# Patient Record
Sex: FEMALE | Race: WHITE | NOT HISPANIC OR LATINO | Employment: OTHER | URBAN - METROPOLITAN AREA
[De-identification: names, ages, dates, MRNs, and addresses within clinical notes are randomized per-mention and may not be internally consistent; named-entity substitution may affect disease eponyms.]

---

## 2023-02-24 LAB — THYROTROPIN (MIU/L) IN SER/PLAS BY DETECTION LIMIT <= 0.05 MIU/L: 1.63 MIU/L (ref 0.44–3.98)

## 2023-09-29 ENCOUNTER — TELEPHONE (OUTPATIENT)
Dept: PRIMARY CARE | Facility: CLINIC | Age: 63
End: 2023-09-29
Payer: COMMERCIAL

## 2023-09-29 RX ORDER — MONTELUKAST SODIUM 10 MG/1
10 TABLET ORAL EVERY 24 HOURS
COMMUNITY
Start: 2023-03-29 | End: 2023-10-02 | Stop reason: SDUPTHER

## 2023-10-02 DIAGNOSIS — J30.9 ALLERGIC RHINITIS, UNSPECIFIED SEASONALITY, UNSPECIFIED TRIGGER: Primary | ICD-10-CM

## 2023-10-02 RX ORDER — MONTELUKAST SODIUM 10 MG/1
10 TABLET ORAL EVERY 24 HOURS
Qty: 90 TABLET | Refills: 1 | Status: SHIPPED | OUTPATIENT
Start: 2023-10-02 | End: 2024-01-08 | Stop reason: SDUPTHER

## 2023-10-05 DIAGNOSIS — E78.5 HYPERLIPIDEMIA, UNSPECIFIED HYPERLIPIDEMIA TYPE: Primary | ICD-10-CM

## 2023-10-05 RX ORDER — ROSUVASTATIN CALCIUM 10 MG/1
10 TABLET, COATED ORAL DAILY
Qty: 30 TABLET | Refills: 1 | Status: SHIPPED | OUTPATIENT
Start: 2023-10-05 | End: 2023-12-27

## 2023-11-10 ENCOUNTER — TELEPHONE (OUTPATIENT)
Dept: PRIMARY CARE | Facility: CLINIC | Age: 63
End: 2023-11-10
Payer: COMMERCIAL

## 2023-11-10 DIAGNOSIS — J30.2 SEASONAL ALLERGIES: Primary | ICD-10-CM

## 2023-11-16 RX ORDER — FLUTICASONE PROPIONATE 50 MCG
1 SPRAY, SUSPENSION (ML) NASAL DAILY
Qty: 16 G | Refills: 1 | Status: SHIPPED | OUTPATIENT
Start: 2023-11-16 | End: 2024-03-29 | Stop reason: SDUPTHER

## 2023-11-21 ENCOUNTER — HOSPITAL ENCOUNTER (OUTPATIENT)
Dept: OPERATING ROOM | Facility: CLINIC | Age: 63
Setting detail: OUTPATIENT SURGERY
Discharge: HOME | End: 2023-11-21
Payer: COMMERCIAL

## 2023-11-21 VITALS
RESPIRATION RATE: 16 BRPM | HEIGHT: 63 IN | OXYGEN SATURATION: 99 % | HEART RATE: 74 BPM | SYSTOLIC BLOOD PRESSURE: 144 MMHG | TEMPERATURE: 97.3 F | DIASTOLIC BLOOD PRESSURE: 80 MMHG | WEIGHT: 161.82 LBS | BODY MASS INDEX: 28.67 KG/M2

## 2023-11-21 DIAGNOSIS — Z12.11 ENCOUNTER FOR SCREENING FOR MALIGNANT NEOPLASM OF COLON: Primary | ICD-10-CM

## 2023-11-21 DIAGNOSIS — Z86.010 PERSONAL HISTORY OF COLONIC POLYPS: ICD-10-CM

## 2023-11-21 LAB
GLUCOSE BLD MANUAL STRIP-MCNC: 117 MG/DL (ref 74–99)
GLUCOSE BLD MANUAL STRIP-MCNC: 77 MG/DL (ref 74–99)
POC FINGERSTICK BLOOD GLUCOSE: 117 MG/DL (ref 70–100)
POC FINGERSTICK BLOOD GLUCOSE: 77 MG/DL (ref 70–100)

## 2023-11-21 PROCEDURE — G0105 COLORECTAL SCRN; HI RISK IND: HCPCS

## 2023-11-21 PROCEDURE — 99152 MOD SED SAME PHYS/QHP 5/>YRS: CPT

## 2023-11-21 PROCEDURE — 7100000010 HC PHASE TWO TIME - EACH INCREMENTAL 1 MINUTE

## 2023-11-21 PROCEDURE — 2500000004 HC RX 250 GENERAL PHARMACY W/ HCPCS (ALT 636 FOR OP/ED): Performed by: INTERNAL MEDICINE

## 2023-11-21 PROCEDURE — 3600000007 HC OR TIME - EACH INCREMENTAL 1 MINUTE - PROCEDURE LEVEL TWO

## 2023-11-21 PROCEDURE — 99153 MOD SED SAME PHYS/QHP EA: CPT

## 2023-11-21 PROCEDURE — G0500 MOD SEDAT ENDO SERVICE >5YRS: HCPCS

## 2023-11-21 PROCEDURE — 45378 DIAGNOSTIC COLONOSCOPY: CPT | Performed by: INTERNAL MEDICINE

## 2023-11-21 PROCEDURE — 3600000002 HC OR TIME - INITIAL BASE CHARGE - PROCEDURE LEVEL TWO

## 2023-11-21 PROCEDURE — 82947 ASSAY GLUCOSE BLOOD QUANT: CPT

## 2023-11-21 PROCEDURE — 2500000005 HC RX 250 GENERAL PHARMACY W/O HCPCS: Performed by: INTERNAL MEDICINE

## 2023-11-21 PROCEDURE — 7100000009 HC PHASE TWO TIME - INITIAL BASE CHARGE

## 2023-11-21 RX ORDER — ALBUTEROL SULFATE 90 UG/1
2 AEROSOL, METERED RESPIRATORY (INHALATION) EVERY 6 HOURS PRN
COMMUNITY

## 2023-11-21 RX ORDER — FENTANYL CITRATE 50 UG/ML
INJECTION, SOLUTION INTRAMUSCULAR; INTRAVENOUS AS NEEDED
Status: COMPLETED | OUTPATIENT
Start: 2023-11-21 | End: 2023-11-21

## 2023-11-21 RX ORDER — MIDAZOLAM HYDROCHLORIDE 1 MG/ML
INJECTION, SOLUTION INTRAMUSCULAR; INTRAVENOUS AS NEEDED
Status: COMPLETED | OUTPATIENT
Start: 2023-11-21 | End: 2023-11-21

## 2023-11-21 RX ORDER — SODIUM CHLORIDE, SODIUM LACTATE, POTASSIUM CHLORIDE, CALCIUM CHLORIDE 600; 310; 30; 20 MG/100ML; MG/100ML; MG/100ML; MG/100ML
20 INJECTION, SOLUTION INTRAVENOUS CONTINUOUS
Status: CANCELLED | OUTPATIENT
Start: 2023-11-21

## 2023-11-21 RX ORDER — BISMUTH SUBSALICYLATE 262 MG
1 TABLET,CHEWABLE ORAL DAILY
COMMUNITY

## 2023-11-21 RX ORDER — ASPIRIN 81 MG/1
81 TABLET ORAL DAILY
COMMUNITY

## 2023-11-21 RX ORDER — HYDROCHLOROTHIAZIDE 12.5 MG/1
12.5 TABLET ORAL DAILY
COMMUNITY
End: 2023-12-12 | Stop reason: ALTCHOICE

## 2023-11-21 RX ORDER — DEXTROSE 50 % IN WATER (D50W) INTRAVENOUS SYRINGE
25
Status: DISCONTINUED | OUTPATIENT
Start: 2023-11-21 | End: 2023-11-22 | Stop reason: HOSPADM

## 2023-11-21 RX ORDER — OMEPRAZOLE 20 MG/1
20 CAPSULE, DELAYED RELEASE ORAL
COMMUNITY

## 2023-11-21 RX ADMIN — MIDAZOLAM HYDROCHLORIDE 1 MG: 1 INJECTION, SOLUTION INTRAMUSCULAR; INTRAVENOUS at 12:15

## 2023-11-21 RX ADMIN — FENTANYL CITRATE 50 MCG: 50 INJECTION, SOLUTION INTRAMUSCULAR; INTRAVENOUS at 12:11

## 2023-11-21 RX ADMIN — MIDAZOLAM HYDROCHLORIDE 1 MG: 1 INJECTION, SOLUTION INTRAMUSCULAR; INTRAVENOUS at 12:20

## 2023-11-21 RX ADMIN — MIDAZOLAM HYDROCHLORIDE 2 MG: 1 INJECTION, SOLUTION INTRAMUSCULAR; INTRAVENOUS at 12:11

## 2023-11-21 RX ADMIN — FENTANYL CITRATE 25 MCG: 50 INJECTION, SOLUTION INTRAMUSCULAR; INTRAVENOUS at 12:20

## 2023-11-21 RX ADMIN — FENTANYL CITRATE 25 MCG: 50 INJECTION, SOLUTION INTRAMUSCULAR; INTRAVENOUS at 12:15

## 2023-11-21 RX ADMIN — Medication 25 G: at 10:50

## 2023-11-21 RX ADMIN — FENTANYL CITRATE 50 MCG: 50 INJECTION, SOLUTION INTRAMUSCULAR; INTRAVENOUS at 12:08

## 2023-11-21 RX ADMIN — MIDAZOLAM HYDROCHLORIDE 2 MG: 1 INJECTION, SOLUTION INTRAMUSCULAR; INTRAVENOUS at 12:08

## 2023-11-21 ASSESSMENT — PAIN - FUNCTIONAL ASSESSMENT
PAIN_FUNCTIONAL_ASSESSMENT: 0-10
PAIN_FUNCTIONAL_ASSESSMENT: 0-10

## 2023-11-21 ASSESSMENT — PAIN SCALES - GENERAL
PAINLEVEL_OUTOF10: 0 - NO PAIN
PAINLEVEL_OUTOF10: 2
PAINLEVEL_OUTOF10: 3
PAINLEVEL_OUTOF10: 4
PAINLEVEL_OUTOF10: 4

## 2023-11-21 NOTE — DISCHARGE INSTRUCTIONS
-The patient has a contact number available for  emergencies.  The signs and symptoms of potential delayed complications were discussed with the patient.  Return to normal activities tomorrow.  Written discharge instructions were provided to the patient.  - Resume previous diet.  - Continue present medications.

## 2023-11-21 NOTE — H&P
History Of Present Illness  Selam Eubanks is a 63 y.o. female presenting with history of colon polyp here for surveillance colonoscopy.     Past Medical History  Past Medical History:   Diagnosis Date    Abnormal finding of blood chemistry, unspecified 2018    Abnormal blood chemistry    Abnormal levels of other serum enzymes 2015    Abnormal AST and ALT    Acute bronchitis, unspecified 2019    Acute exacerbation of chronic bronchitis    Acute bronchitis, unspecified 2018    Acute bronchitis due to infection    Acute maxillary sinusitis, unspecified 2019    Acute maxillary sinusitis    Acute maxillary sinusitis, unspecified 2019    Acute maxillary sinusitis    Acute upper respiratory infection, unspecified 2020    Viral URI with cough    Huizar's esophagus without dysplasia 2017    Huizar's esophagus without dysplasia    Huizar's esophagus without dysplasia 2016    Huizar's esophagus without dysplasia    Huizar's esophagus without dysplasia 2016    Huizar's esophagus    Chronic maxillary sinusitis 2019    Maxillary sinusitis    Encounter for full-term uncomplicated delivery      (spontaneous vaginal delivery)    Other conditions influencing health status     Complete Colonoscopy    Other general symptoms and signs 2020    Influenza-like symptoms    Other long term (current) drug therapy 10/21/2020    Chronic use of benzodiazepine for therapeutic purpose    Other specified anxiety disorders 2016    Depression with anxiety    Personal history of other diseases of the digestive system 2016    History of esophageal reflux    Personal history of other diseases of the digestive system 2015    History of hiatal hernia    Personal history of other diseases of the digestive system 2020    History of gastroesophageal reflux (GERD)    Personal history of other diseases of the musculoskeletal system and connective tissue  08/25/2022    History of rheumatoid arthritis    Personal history of other diseases of the nervous system and sense organs     History of blepharitis    Personal history of other diseases of the nervous system and sense organs 05/14/2014    History of carpal tunnel syndrome    Personal history of other diseases of the respiratory system 09/20/2017    History of sinusitis    Personal history of other diseases of the respiratory system 09/16/2019    History of acute bronchitis with bronchospasm    Personal history of other diseases of the respiratory system 03/12/2020    History of influenza    Personal history of other diseases of the respiratory system 10/21/2018    History of bronchitis    Personal history of other specified conditions 02/02/2018    History of persistent cough    Pleurodynia 02/02/2018    Rib pain on left side    Radiculopathy, lumbosacral region 07/30/2020    Lumbosacral radiculitis    Spinal stenosis, lumbar region without neurogenic claudication 06/05/2020    Stenosis of lateral recess of lumbar spine    Trigger finger, right ring finger 08/20/2019    Trigger ring finger of right hand    Trigger finger, unspecified finger 05/15/2013    Trigger finger, acquired    Vitamin D deficiency, unspecified 07/25/2016    Vitamin D deficiency       Surgical History  Past Surgical History:   Procedure Laterality Date    COLONOSCOPY  03/20/2017    Colonoscopy (Fiberoptic)    CT ANGIO NECK  11/15/2021    CT NECK ANGIO W AND WO IV CONTRAST 11/15/2021 [facility]    MOUTH SURGERY  03/20/2017    Oral Surgery Tooth Extraction    OTHER SURGICAL HISTORY  04/01/2015    Drainage Of Ovarian Cyst(S)    OTHER SURGICAL HISTORY  03/20/2017    Endoscopy - Papillotomy    TONSILLECTOMY  03/20/2017    Tonsillectomy With Adenoidectomy    TUBAL LIGATION  04/01/2015    Tubal Ligation        Social History  She has no history on file for tobacco use, alcohol use, and drug use.    Family History  No family history on  "file.     Allergies  Penicillins    Review of Systems  A 10+ point review of systems was completed and otherwise negative.       Physical Exam  CONSTITUTIONAL: no acute distress, appears stated age  PULMONARY: clear to auscultation bilaterally  CARDIOVASCULAR: regular rate and rhythm  ABDOMEN: soft, non-tender  NEUROLOGIC: alert and oriented to person/place/time       Last Recorded Vitals  Blood pressure 168/77, pulse 86, temperature 36.3 °C (97.3 °F), temperature source Temporal, resp. rate 16, height 1.6 m (5' 3\"), weight 73.4 kg (161 lb 13.1 oz), SpO2 98 %.    Relevant Results    Assessment/Plan   Active Problems:  There are no active Hospital Problems.      Will proceed with surveillance colonoscopy           Tabitha Douglas MD    "

## 2023-12-12 ENCOUNTER — OFFICE VISIT (OUTPATIENT)
Dept: RHEUMATOLOGY | Facility: CLINIC | Age: 63
End: 2023-12-12
Payer: COMMERCIAL

## 2023-12-12 VITALS — WEIGHT: 163 LBS | BODY MASS INDEX: 28.87 KG/M2 | SYSTOLIC BLOOD PRESSURE: 120 MMHG | DIASTOLIC BLOOD PRESSURE: 72 MMHG

## 2023-12-12 DIAGNOSIS — M06.9 RHEUMATOID ARTHRITIS, INVOLVING UNSPECIFIED SITE, UNSPECIFIED WHETHER RHEUMATOID FACTOR PRESENT (MULTI): ICD-10-CM

## 2023-12-12 DIAGNOSIS — M19.90 OSTEOARTHRITIS, UNSPECIFIED OSTEOARTHRITIS TYPE, UNSPECIFIED SITE: Primary | ICD-10-CM

## 2023-12-12 PROCEDURE — 99214 OFFICE O/P EST MOD 30 MIN: CPT | Performed by: INTERNAL MEDICINE

## 2023-12-12 PROCEDURE — 1159F MED LIST DOCD IN RCRD: CPT | Performed by: INTERNAL MEDICINE

## 2023-12-12 PROCEDURE — 1126F AMNT PAIN NOTED NONE PRSNT: CPT | Performed by: INTERNAL MEDICINE

## 2023-12-12 RX ORDER — LOSARTAN POTASSIUM 50 MG/1
50 TABLET ORAL DAILY
COMMUNITY
End: 2024-02-06 | Stop reason: SDUPTHER

## 2023-12-12 RX ORDER — HYDROCHLOROTHIAZIDE 25 MG/1
25 TABLET ORAL DAILY
COMMUNITY

## 2023-12-12 NOTE — PROGRESS NOTES
"Recheck  OA  /  RA    doing well    HPI - doing well.  No partic pain \"I just ache every day\" \"I don't know if it's my back or what it is\" - nena with weather changes.  Not bad enough to take anything.  No swelling.  AM stiffness 20-30 min.  No CP, resp, or GI.  Sees eye dr    PE  NAD  RRR no r/m/g  CTA  No edema  No synovitis  NT and no pain with ROM throughout    A/P - OA and RA, doing well, although she does c/o mild daily aching, not bad enough that she wants to do anything.  She will call if sx incr  Reviewed prev labs  Her primary will be doing yearly labs  Reeval 6 mo or sooner PRN    "

## 2023-12-19 DIAGNOSIS — E78.5 HYPERLIPIDEMIA, UNSPECIFIED HYPERLIPIDEMIA TYPE: ICD-10-CM

## 2023-12-20 NOTE — TELEPHONE ENCOUNTER
Pt last OV was 07/10/2023, called in for refill on [facility] also [facility]. Please advise

## 2023-12-26 NOTE — TELEPHONE ENCOUNTER
Pt called back and states she has not heard anything about her rx being sent in. She states she only has a few days left.

## 2023-12-27 RX ORDER — ROSUVASTATIN CALCIUM 10 MG/1
10 TABLET, COATED ORAL DAILY
Qty: 30 TABLET | Refills: 0 | Status: SHIPPED | OUTPATIENT
Start: 2023-12-27 | End: 2023-12-29

## 2023-12-28 DIAGNOSIS — E78.5 HYPERLIPIDEMIA, UNSPECIFIED HYPERLIPIDEMIA TYPE: ICD-10-CM

## 2023-12-28 NOTE — TELEPHONE ENCOUNTER
Patient called again regarding rx request. States she has been calling since last week.  Patient states she only has one pill left.

## 2023-12-29 RX ORDER — ROSUVASTATIN CALCIUM 10 MG/1
10 TABLET, COATED ORAL DAILY
Qty: 90 TABLET | Refills: 1 | Status: SHIPPED | OUTPATIENT
Start: 2023-12-29 | End: 2024-01-08 | Stop reason: SDUPTHER

## 2024-01-08 ENCOUNTER — OFFICE VISIT (OUTPATIENT)
Dept: PRIMARY CARE | Facility: CLINIC | Age: 64
End: 2024-01-08
Payer: COMMERCIAL

## 2024-01-08 VITALS
OXYGEN SATURATION: 97 % | WEIGHT: 161.5 LBS | RESPIRATION RATE: 18 BRPM | HEIGHT: 63 IN | DIASTOLIC BLOOD PRESSURE: 80 MMHG | HEART RATE: 86 BPM | SYSTOLIC BLOOD PRESSURE: 122 MMHG | BODY MASS INDEX: 28.62 KG/M2 | TEMPERATURE: 96.1 F

## 2024-01-08 DIAGNOSIS — R79.89 ABNORMAL TSH: ICD-10-CM

## 2024-01-08 DIAGNOSIS — Z87.891 PERSONAL HISTORY OF TOBACCO USE, PRESENTING HAZARDS TO HEALTH: ICD-10-CM

## 2024-01-08 DIAGNOSIS — J30.89 NON-SEASONAL ALLERGIC RHINITIS, UNSPECIFIED TRIGGER: ICD-10-CM

## 2024-01-08 DIAGNOSIS — E55.9 VITAMIN D DEFICIENCY: ICD-10-CM

## 2024-01-08 DIAGNOSIS — J30.9 ALLERGIC RHINITIS, UNSPECIFIED SEASONALITY, UNSPECIFIED TRIGGER: ICD-10-CM

## 2024-01-08 DIAGNOSIS — J45.20 MILD INTERMITTENT REACTIVE AIRWAY DISEASE WITHOUT COMPLICATION (HHS-HCC): ICD-10-CM

## 2024-01-08 DIAGNOSIS — Z12.31 ENCOUNTER FOR SCREENING MAMMOGRAM FOR BREAST CANCER: ICD-10-CM

## 2024-01-08 DIAGNOSIS — I10 PRIMARY HYPERTENSION: ICD-10-CM

## 2024-01-08 DIAGNOSIS — E78.5 HYPERLIPIDEMIA, UNSPECIFIED HYPERLIPIDEMIA TYPE: ICD-10-CM

## 2024-01-08 DIAGNOSIS — Z00.00 ANNUAL PHYSICAL EXAM: Primary | ICD-10-CM

## 2024-01-08 PROBLEM — H25.13 NUCLEAR SCLEROSIS OF BOTH EYES: Status: ACTIVE | Noted: 2024-01-08

## 2024-01-08 PROBLEM — L30.0 NUMMULAR DERMATITIS: Status: ACTIVE | Noted: 2018-10-24

## 2024-01-08 PROBLEM — K59.09 CHRONIC CONSTIPATION: Status: ACTIVE | Noted: 2024-01-08

## 2024-01-08 PROBLEM — D12.6 ADENOMA OF COLON: Status: ACTIVE | Noted: 2024-01-08

## 2024-01-08 PROBLEM — Z85.828 PERSONAL HISTORY OF OTHER MALIGNANT NEOPLASM OF SKIN: Status: ACTIVE | Noted: 2018-10-24

## 2024-01-08 PROBLEM — K22.70 BARRETT'S ESOPHAGUS WITHOUT DYSPLASIA: Status: ACTIVE | Noted: 2024-01-08

## 2024-01-08 PROBLEM — M54.16 LUMBAR RADICULAR PAIN: Status: ACTIVE | Noted: 2024-01-08

## 2024-01-08 PROBLEM — L23.9 ECZEMA, ALLERGIC: Status: ACTIVE | Noted: 2024-01-08

## 2024-01-08 PROBLEM — F51.01 PRIMARY INSOMNIA: Status: ACTIVE | Noted: 2024-01-08

## 2024-01-08 PROBLEM — F41.9 ANXIETY: Status: ACTIVE | Noted: 2024-01-08

## 2024-01-08 PROBLEM — M05.9 RHEUMATOID ARTHRITIS WITH POSITIVE RHEUMATOID FACTOR (MULTI): Status: ACTIVE | Noted: 2024-01-08

## 2024-01-08 PROBLEM — G45.8 SUBCLAVIAN STEAL SYNDROME: Status: ACTIVE | Noted: 2024-01-08

## 2024-01-08 PROBLEM — M15.9 OSTEOARTHRITIS OF MULTIPLE JOINTS: Status: ACTIVE | Noted: 2024-01-08

## 2024-01-08 PROBLEM — M51.379 DEGENERATION OF INTERVERTEBRAL DISC OF LUMBOSACRAL REGION: Status: ACTIVE | Noted: 2024-01-08

## 2024-01-08 PROBLEM — M51.37 DEGENERATION OF INTERVERTEBRAL DISC OF LUMBOSACRAL REGION: Status: ACTIVE | Noted: 2024-01-08

## 2024-01-08 PROBLEM — R42 VERTIGO: Status: ACTIVE | Noted: 2024-01-08

## 2024-01-08 PROCEDURE — 3079F DIAST BP 80-89 MM HG: CPT | Performed by: FAMILY MEDICINE

## 2024-01-08 PROCEDURE — G0402 INITIAL PREVENTIVE EXAM: HCPCS | Performed by: FAMILY MEDICINE

## 2024-01-08 PROCEDURE — 1126F AMNT PAIN NOTED NONE PRSNT: CPT | Performed by: FAMILY MEDICINE

## 2024-01-08 PROCEDURE — 1159F MED LIST DOCD IN RCRD: CPT | Performed by: FAMILY MEDICINE

## 2024-01-08 PROCEDURE — 1036F TOBACCO NON-USER: CPT | Performed by: FAMILY MEDICINE

## 2024-01-08 PROCEDURE — 3074F SYST BP LT 130 MM HG: CPT | Performed by: FAMILY MEDICINE

## 2024-01-08 RX ORDER — ROSUVASTATIN CALCIUM 10 MG/1
10 TABLET, COATED ORAL DAILY
Qty: 90 TABLET | Refills: 3 | Status: SHIPPED | OUTPATIENT
Start: 2024-01-08 | End: 2025-01-07

## 2024-01-08 RX ORDER — ALBUTEROL SULFATE 0.83 MG/ML
2.5 SOLUTION RESPIRATORY (INHALATION) 4 TIMES DAILY PRN
Qty: 9 ML | Refills: 11 | Status: SHIPPED | OUTPATIENT
Start: 2024-01-08 | End: 2025-01-07

## 2024-01-08 RX ORDER — MONTELUKAST SODIUM 10 MG/1
10 TABLET ORAL EVERY 24 HOURS
Qty: 90 TABLET | Refills: 3 | Status: SHIPPED | OUTPATIENT
Start: 2024-01-08 | End: 2025-01-07

## 2024-01-08 RX ORDER — POTASSIUM CHLORIDE 750 MG/1
10 TABLET, EXTENDED RELEASE ORAL DAILY
COMMUNITY

## 2024-01-08 ASSESSMENT — LIFESTYLE VARIABLES
HOW OFTEN DO YOU HAVE SIX OR MORE DRINKS ON ONE OCCASION: NEVER
HOW MANY STANDARD DRINKS CONTAINING ALCOHOL DO YOU HAVE ON A TYPICAL DAY: 1 OR 2
SKIP TO QUESTIONS 9-10: 1
HOW OFTEN DO YOU HAVE A DRINK CONTAINING ALCOHOL: MONTHLY OR LESS
AUDIT-C TOTAL SCORE: 1

## 2024-01-08 ASSESSMENT — PAIN SCALES - GENERAL: PAINLEVEL: 0-NO PAIN

## 2024-01-08 ASSESSMENT — PATIENT HEALTH QUESTIONNAIRE - PHQ9
1. LITTLE INTEREST OR PLEASURE IN DOING THINGS: NOT AT ALL
SUM OF ALL RESPONSES TO PHQ9 QUESTIONS 1 AND 2: 0
2. FEELING DOWN, DEPRESSED OR HOPELESS: NOT AT ALL

## 2024-01-08 NOTE — PROGRESS NOTES
"Subjective   Reason for Visit: Selam Eubanks is an 63 y.o. female here for a Medicare Wellness visit.     Past Medical, Surgical, and Family History reviewed and updated in chart.         Doing well. Colonoscopy UTD.   RA, HTN, GERD are stable.  Concerns with allergic rhinitis, would like allergy testing.        Patient Care Team:  Samuel Colby MD as PCP - General (Family Medicine)  Samuel Colby MD as PCP - [employer] PCP  Marbella Hartman MD as Consulting Physician (Rheumatology)  Tabitha Douglas MD as Surgeon (Gastroenterology)  SEVERO Gayle-CNP as Nurse Practitioner (Cardiology)     Review of Systems  Negative    Objective   Vitals:  /80   Pulse 86   Temp 35.6 °C (96.1 °F)   Resp 18   Ht 1.6 m (5' 3\")   Wt 73.3 kg (161 lb 8 oz)   SpO2 97%   BMI 28.61 kg/m²       Physical Exam  Vitals and nursing note reviewed.   Constitutional:       Appearance: Normal appearance.   HENT:      Head: Normocephalic.      Right Ear: Tympanic membrane normal.      Left Ear: Tympanic membrane normal.      Nose: Nose normal.      Mouth/Throat:      Mouth: Mucous membranes are moist.   Eyes:      Extraocular Movements: Extraocular movements intact.      Conjunctiva/sclera: Conjunctivae normal.      Pupils: Pupils are equal, round, and reactive to light.   Cardiovascular:      Rate and Rhythm: Normal rate and regular rhythm.      Heart sounds: Normal heart sounds.   Pulmonary:      Effort: Pulmonary effort is normal. No respiratory distress.      Breath sounds: Normal breath sounds.   Abdominal:      General: Abdomen is flat.      Palpations: Abdomen is soft.      Tenderness: There is no abdominal tenderness.   Musculoskeletal:      Cervical back: Neck supple.   Lymphadenopathy:      Cervical: No cervical adenopathy.   Skin:     General: Skin is warm and dry.      Findings: No rash.   Neurological:      General: No focal deficit present.      Mental Status: She is alert. Mental status is at baseline.      " Coordination: Coordination normal.      Gait: Gait normal.      Deep Tendon Reflexes: Reflexes normal.   Psychiatric:         Mood and Affect: Mood normal.         Behavior: Behavior normal.         Assessment/Plan   Problem List Items Addressed This Visit       HLD (hyperlipidemia)    Relevant Medications    rosuvastatin (Crestor) 10 mg tablet    Other Relevant Orders    Lipid Panel (Completed)    Comprehensive Metabolic Panel (Completed)    TSH with reflex to Free T4 if abnormal (Completed)    Hypertension     Other Visit Diagnoses       Annual physical exam    -  Primary    Mild intermittent reactive airway disease without complication        Relevant Medications    albuterol 2.5 mg /3 mL (0.083 %) nebulizer solution    Allergic rhinitis, unspecified seasonality, unspecified trigger        Relevant Medications    montelukast (Singulair) 10 mg tablet    Personal history of tobacco use, presenting hazards to health        Relevant Orders    CT lung screening low dose    Encounter for screening mammogram for breast cancer        Relevant Orders    BI mammo bilateral screening tomosynthesis    Vitamin D deficiency        Relevant Orders    Vitamin D 25-Hydroxy,Total (for eval of Vitamin D levels) (Completed)    Non-seasonal allergic rhinitis, unspecified trigger        Relevant Orders    Respiratory Allergy Profile IgE (Completed)    Food Allergy Profile IgE (Completed)    Abnormal TSH

## 2024-01-09 ENCOUNTER — LAB (OUTPATIENT)
Dept: LAB | Facility: LAB | Age: 64
End: 2024-01-09
Payer: COMMERCIAL

## 2024-01-09 DIAGNOSIS — E87.1 HYPONATREMIA: Primary | ICD-10-CM

## 2024-01-09 DIAGNOSIS — E55.9 VITAMIN D DEFICIENCY: ICD-10-CM

## 2024-01-09 DIAGNOSIS — J30.89 NON-SEASONAL ALLERGIC RHINITIS, UNSPECIFIED TRIGGER: ICD-10-CM

## 2024-01-09 DIAGNOSIS — E78.5 HYPERLIPIDEMIA, UNSPECIFIED HYPERLIPIDEMIA TYPE: ICD-10-CM

## 2024-01-09 LAB
25(OH)D3 SERPL-MCNC: 43 NG/ML (ref 30–100)
ALBUMIN SERPL BCP-MCNC: 4.1 G/DL (ref 3.4–5)
ALP SERPL-CCNC: 83 U/L (ref 33–136)
ALT SERPL W P-5'-P-CCNC: 35 U/L (ref 7–45)
ANION GAP SERPL CALC-SCNC: 17 MMOL/L
AST SERPL W P-5'-P-CCNC: 55 U/L (ref 9–39)
BILIRUB SERPL-MCNC: 0.4 MG/DL (ref 0–1.2)
BUN SERPL-MCNC: 16 MG/DL (ref 6–23)
CALCIUM SERPL-MCNC: 9.5 MG/DL (ref 8.6–10.3)
CHLORIDE SERPL-SCNC: 89 MMOL/L (ref 98–107)
CHOLEST SERPL-MCNC: 185 MG/DL (ref 0–199)
CHOLESTEROL/HDL RATIO: 1.6
CO2 SERPL-SCNC: 27 MMOL/L (ref 21–32)
CREAT SERPL-MCNC: 0.91 MG/DL (ref 0.5–1.05)
EGFRCR SERPLBLD CKD-EPI 2021: 70 ML/MIN/1.73M*2
GLUCOSE SERPL-MCNC: 70 MG/DL (ref 74–99)
HDLC SERPL-MCNC: 113.9 MG/DL
LDLC SERPL CALC-MCNC: 57 MG/DL
NON HDL CHOLESTEROL: 71 MG/DL (ref 0–149)
POTASSIUM SERPL-SCNC: 3.7 MMOL/L (ref 3.5–5.3)
PROT SERPL-MCNC: 7.5 G/DL (ref 6.4–8.2)
SODIUM SERPL-SCNC: 129 MMOL/L (ref 136–145)
TRIGL SERPL-MCNC: 70 MG/DL (ref 0–149)
TSH SERPL-ACNC: 1.73 MIU/L (ref 0.44–3.98)
VLDL: 14 MG/DL (ref 0–40)

## 2024-01-09 PROCEDURE — 86003 ALLG SPEC IGE CRUDE XTRC EA: CPT

## 2024-01-09 PROCEDURE — 36415 COLL VENOUS BLD VENIPUNCTURE: CPT

## 2024-01-09 PROCEDURE — 84443 ASSAY THYROID STIM HORMONE: CPT

## 2024-01-09 PROCEDURE — 80053 COMPREHEN METABOLIC PANEL: CPT

## 2024-01-09 PROCEDURE — 80061 LIPID PANEL: CPT

## 2024-01-09 PROCEDURE — 82306 VITAMIN D 25 HYDROXY: CPT

## 2024-01-09 PROCEDURE — 82785 ASSAY OF IGE: CPT

## 2024-01-10 LAB
A ALTERNATA IGE QN: <0.1 KU/L
A FUMIGATUS IGE QN: <0.1 KU/L
BERMUDA GRASS IGE QN: <0.1 KU/L
BOXELDER IGE QN: <0.1 KU/L
C HERBARUM IGE QN: <0.1 KU/L
CALIF WALNUT POLN IGE QN: <0.1 KU/L
CAT DANDER IGE QN: <0.1 KU/L
CLAM IGE QN: <0.1 KU/L
CMN PIGWEED IGE QN: <0.1 KU/L
CODFISH IGE QN: <0.1 KU/L
COMMON RAGWEED IGE QN: <0.1 KU/L
CORN IGE QN: <0.1
COTTONWOOD IGE QN: <0.1 KU/L
D FARINAE IGE QN: <0.1 KU/L
D PTERONYSS IGE QN: <0.1 KU/L
DOG DANDER IGE QN: <0.1 KU/L
EGG WHITE IGE QN: <0.1 KU/L
ENGL PLANTAIN IGE QN: <0.1 KU/L
GOOSEFOOT IGE QN: <0.1 KU/L
JOHNSON GRASS IGE QN: <0.1 KU/L
KENT BLUE GRASS IGE QN: <0.1 KU/L
LONDON PLANE IGE QN: <0.1 KU/L
MILK IGE QN: <0.1 KU/L
MT JUNIPER IGE QN: <0.1 KU/L
P NOTATUM IGE QN: <0.1 KU/L
PEANUT IGE QN: <0.1 KU/L
PECAN/HICK TREE IGE QN: <0.1 KU/L
ROACH IGE QN: <0.1 KU/L
SALTWORT IGE QN: <0.1 KU/L
SCALLOP IGE QN: <0.1 KU/L
SESAME SEED IGE QN: <0.1 KU/L
SHEEP SORREL IGE QN: <0.1 KU/L
SHRIMP IGE QN: <0.1 KU/L
SILVER BIRCH IGE QN: <0.1 KU/L
SOYBEAN IGE QN: <0.1 KU/L
TIMOTHY IGE QN: <0.1 KU/L
TOTAL IGE SMQN RAST: 23.6 KU/L
WALNUT IGE QN: <0.1 KU/L
WHEAT IGE QN: <0.1 KU/L
WHITE ASH IGE QN: <0.1 KU/L
WHITE ELM IGE QN: <0.1 KU/L
WHITE MULBERRY IGE QN: <0.1 KU/L
WHITE OAK IGE QN: <0.1 KU/L

## 2024-01-15 ENCOUNTER — LAB (OUTPATIENT)
Dept: LAB | Facility: LAB | Age: 64
End: 2024-01-15
Payer: COMMERCIAL

## 2024-01-15 DIAGNOSIS — E87.1 HYPONATREMIA: ICD-10-CM

## 2024-01-15 LAB
ANION GAP SERPL CALC-SCNC: 15 MMOL/L (ref 10–20)
BUN SERPL-MCNC: 15 MG/DL (ref 6–23)
CALCIUM SERPL-MCNC: 9.7 MG/DL (ref 8.6–10.3)
CHLORIDE SERPL-SCNC: 93 MMOL/L (ref 98–107)
CO2 SERPL-SCNC: 32 MMOL/L (ref 21–32)
CREAT SERPL-MCNC: 0.92 MG/DL (ref 0.5–1.05)
EGFRCR SERPLBLD CKD-EPI 2021: 69 ML/MIN/1.73M*2
GLUCOSE SERPL-MCNC: 107 MG/DL (ref 74–99)
POTASSIUM SERPL-SCNC: 4.1 MMOL/L (ref 3.5–5.3)
SODIUM SERPL-SCNC: 136 MMOL/L (ref 136–145)

## 2024-01-15 PROCEDURE — 36415 COLL VENOUS BLD VENIPUNCTURE: CPT

## 2024-01-15 PROCEDURE — 80048 BASIC METABOLIC PNL TOTAL CA: CPT

## 2024-01-23 ENCOUNTER — APPOINTMENT (OUTPATIENT)
Dept: RADIOLOGY | Facility: HOSPITAL | Age: 64
End: 2024-01-23
Payer: COMMERCIAL

## 2024-02-06 DIAGNOSIS — I10 PRIMARY HYPERTENSION: ICD-10-CM

## 2024-02-06 RX ORDER — LOSARTAN POTASSIUM 50 MG/1
50 TABLET ORAL DAILY
Qty: 90 TABLET | Refills: 1 | Status: SHIPPED | OUTPATIENT
Start: 2024-02-06

## 2024-02-06 NOTE — TELEPHONE ENCOUNTER
[employer] requesting refill for populated medication. Gets them filled at [facility]. Pt had a physical on 1/08/2024.

## 2024-02-15 ENCOUNTER — HOSPITAL ENCOUNTER (OUTPATIENT)
Dept: RADIOLOGY | Facility: HOSPITAL | Age: 64
Discharge: HOME | End: 2024-02-15
Payer: COMMERCIAL

## 2024-02-15 VITALS — BODY MASS INDEX: 29.77 KG/M2 | HEIGHT: 63 IN | WEIGHT: 168 LBS

## 2024-02-15 DIAGNOSIS — M06.9 RHEUMATOID ARTHRITIS, INVOLVING UNSPECIFIED SITE, UNSPECIFIED WHETHER RHEUMATOID FACTOR PRESENT (MULTI): ICD-10-CM

## 2024-02-15 DIAGNOSIS — Z12.31 ENCOUNTER FOR SCREENING MAMMOGRAM FOR BREAST CANCER: ICD-10-CM

## 2024-02-15 DIAGNOSIS — Z87.891 PERSONAL HISTORY OF TOBACCO USE, PRESENTING HAZARDS TO HEALTH: ICD-10-CM

## 2024-02-15 PROCEDURE — 77067 SCR MAMMO BI INCL CAD: CPT

## 2024-02-15 PROCEDURE — 71271 CT THORAX LUNG CANCER SCR C-: CPT

## 2024-02-16 RX ORDER — HYDROXYCHLOROQUINE SULFATE 200 MG/1
TABLET, FILM COATED ORAL 2 TIMES DAILY
Qty: 180 TABLET | Refills: 1 | Status: SHIPPED | OUTPATIENT
Start: 2024-02-16

## 2024-02-19 DIAGNOSIS — L98.9 LESION OF NECK: Primary | ICD-10-CM

## 2024-02-19 NOTE — PROGRESS NOTES
CT lung cancer screening is benign but other cytic lesion seen on front of neck. Order placed for follow up ultrasound.

## 2024-02-23 ENCOUNTER — HOSPITAL ENCOUNTER (OUTPATIENT)
Dept: RADIOLOGY | Facility: CLINIC | Age: 64
Discharge: HOME | End: 2024-02-23
Payer: COMMERCIAL

## 2024-02-23 DIAGNOSIS — L98.9 LESION OF NECK: ICD-10-CM

## 2024-02-23 PROCEDURE — 76536 US EXAM OF HEAD AND NECK: CPT

## 2024-02-23 PROCEDURE — 76536 US EXAM OF HEAD AND NECK: CPT | Performed by: RADIOLOGY

## 2024-02-27 ENCOUNTER — PATIENT MESSAGE (OUTPATIENT)
Dept: PRIMARY CARE | Facility: CLINIC | Age: 64
End: 2024-02-27
Payer: COMMERCIAL

## 2024-02-27 ENCOUNTER — TELEPHONE (OUTPATIENT)
Dept: PRIMARY CARE | Facility: CLINIC | Age: 64
End: 2024-02-27
Payer: COMMERCIAL

## 2024-02-27 NOTE — TELEPHONE ENCOUNTER
----- Message from Selam Eubanks sent at 2/27/2024  2:59 PM EST -----  Regarding: Pravin  Contact: [phone]  I see there is my ultrasound results which look abnormal when will I hear from you? It is concerning to me. Will you call?

## 2024-02-27 NOTE — TELEPHONE ENCOUNTER
Pt had a follow up US on Monday 2/23/24 if you can please review she would like result. The report is in the result from 2/19/24.

## 2024-03-06 ENCOUNTER — APPOINTMENT (OUTPATIENT)
Dept: PRIMARY CARE | Facility: CLINIC | Age: 64
End: 2024-03-06
Payer: COMMERCIAL

## 2024-03-07 DIAGNOSIS — M06.9 RHEUMATOID ARTHRITIS, INVOLVING UNSPECIFIED SITE, UNSPECIFIED WHETHER RHEUMATOID FACTOR PRESENT (MULTI): Primary | ICD-10-CM

## 2024-03-08 RX ORDER — FOLIC ACID 1 MG/1
2 TABLET ORAL DAILY
Qty: 180 TABLET | Refills: 1 | Status: SHIPPED | OUTPATIENT
Start: 2024-03-08

## 2024-03-11 ENCOUNTER — OFFICE VISIT (OUTPATIENT)
Dept: PRIMARY CARE | Facility: CLINIC | Age: 64
End: 2024-03-11
Payer: COMMERCIAL

## 2024-03-11 VITALS
OXYGEN SATURATION: 99 % | SYSTOLIC BLOOD PRESSURE: 128 MMHG | HEART RATE: 86 BPM | DIASTOLIC BLOOD PRESSURE: 80 MMHG | BODY MASS INDEX: 29.13 KG/M2 | HEIGHT: 63 IN | WEIGHT: 164.4 LBS | TEMPERATURE: 95.2 F

## 2024-03-11 DIAGNOSIS — R14.0 ABDOMINAL BLOATING: Primary | ICD-10-CM

## 2024-03-11 PROCEDURE — 99214 OFFICE O/P EST MOD 30 MIN: CPT | Performed by: FAMILY MEDICINE

## 2024-03-11 PROCEDURE — 1158F ADVNC CARE PLAN TLK DOCD: CPT | Performed by: FAMILY MEDICINE

## 2024-03-11 PROCEDURE — 1036F TOBACCO NON-USER: CPT | Performed by: FAMILY MEDICINE

## 2024-03-11 PROCEDURE — 1125F AMNT PAIN NOTED PAIN PRSNT: CPT | Performed by: FAMILY MEDICINE

## 2024-03-11 PROCEDURE — 1157F ADVNC CARE PLAN IN RCRD: CPT | Performed by: FAMILY MEDICINE

## 2024-03-11 PROCEDURE — 3079F DIAST BP 80-89 MM HG: CPT | Performed by: FAMILY MEDICINE

## 2024-03-11 PROCEDURE — 1123F ACP DISCUSS/DSCN MKR DOCD: CPT | Performed by: FAMILY MEDICINE

## 2024-03-11 PROCEDURE — 3008F BODY MASS INDEX DOCD: CPT | Performed by: FAMILY MEDICINE

## 2024-03-11 PROCEDURE — 1159F MED LIST DOCD IN RCRD: CPT | Performed by: FAMILY MEDICINE

## 2024-03-11 PROCEDURE — 3074F SYST BP LT 130 MM HG: CPT | Performed by: FAMILY MEDICINE

## 2024-03-11 RX ORDER — SIMETHICONE 125 MG
125 CAPSULE ORAL EVERY 6 HOURS PRN
Qty: 28 CAPSULE | Refills: 0 | Status: SHIPPED | OUTPATIENT
Start: 2024-03-11

## 2024-03-11 ASSESSMENT — LIFESTYLE VARIABLES
AUDIT-C TOTAL SCORE: 4
HOW MANY STANDARD DRINKS CONTAINING ALCOHOL DO YOU HAVE ON A TYPICAL DAY: 3 OR 4
HOW OFTEN DO YOU HAVE SIX OR MORE DRINKS ON ONE OCCASION: NEVER
HOW OFTEN DO YOU HAVE A DRINK CONTAINING ALCOHOL: 2-3 TIMES A WEEK
SKIP TO QUESTIONS 9-10: 0

## 2024-03-11 ASSESSMENT — PAIN SCALES - GENERAL: PAINLEVEL: 8

## 2024-03-11 ASSESSMENT — PATIENT HEALTH QUESTIONNAIRE - PHQ9
SUM OF ALL RESPONSES TO PHQ9 QUESTIONS 1 AND 2: 0
1. LITTLE INTEREST OR PLEASURE IN DOING THINGS: NOT AT ALL
2. FEELING DOWN, DEPRESSED OR HOPELESS: NOT AT ALL

## 2024-03-11 NOTE — PROGRESS NOTES
"History Of Present Illness  eSlam Eubanks is a 63 y.o. female presenting for Abdominal Pain (C/o of abdominal pain since the start of jan. Says her abdomen feels \"tight\") and Weight Loss (Wants to talk to doctor about weight loss.)  .    HPI   Upper abdominal bloating. No pain. Feels \"tight\" bilaterally. Bms daily, takes miralax once a week.  She is not nauseous, no vomiting or diarrhea.    Wants medication to help with weight loss.    Past Medical History  Patient Active Problem List    Diagnosis Date Noted    Huizar's esophagus without dysplasia 01/08/2024    Anxiety 01/08/2024    Chronic constipation 01/08/2024    HLD (hyperlipidemia) 01/08/2024    Hypertension 01/08/2024    Lumbar radicular pain 01/08/2024    Primary insomnia 01/08/2024    Rheumatoid arthritis with positive rheumatoid factor (CMS/HCC) 01/08/2024    Vertigo 01/08/2024    Subclavian steal syndrome 01/08/2024    Nuclear sclerosis of both eyes 01/08/2024    Osteoarthritis of multiple joints 01/08/2024    Eczema, allergic 01/08/2024    Degeneration of intervertebral disc of lumbosacral region 01/08/2024    Adenoma of colon 01/08/2024    Personal history of other malignant neoplasm of skin 10/24/2018    Nummular dermatitis 10/24/2018        Medications  Current Outpatient Medications on File Prior to Visit   Medication Sig    albuterol (Proventil HFA) 90 mcg/actuation inhaler Inhale 2 puffs every 6 hours if needed for wheezing.    albuterol 2.5 mg /3 mL (0.083 %) nebulizer solution Take 3 mL (2.5 mg) by nebulization 4 times a day as needed for wheezing or shortness of breath.    aspirin 81 mg EC tablet Take 1 tablet (81 mg) by mouth once daily.    fluticasone (Flonase) 50 mcg/actuation nasal spray Administer 1 spray into each nostril once daily.    folic acid (Folvite) 1 mg tablet TAKE TWO TABLETS BY MOUTH once a DAY    hydroCHLOROthiazide (HYDRODiuril) 25 mg tablet Take 1 tablet (25 mg) by mouth once daily.    hydroxychloroquine (Plaquenil) 200 mg " tablet TAKE ONE TABLET BY MOUTH TWO TIMES A DAY    losartan (Cozaar) 50 mg tablet Take 1 tablet (50 mg) by mouth once daily.    montelukast (Singulair) 10 mg tablet Take 1 tablet (10 mg) by mouth once every 24 hours.    multivitamin tablet Take 1 tablet by mouth once daily.    omeprazole (PriLOSEC) 20 mg DR capsule Take 1 capsule (20 mg) by mouth. Do not crush or chew.    potassium chloride CR 10 mEq ER tablet Take 1 tablet (10 mEq) by mouth once daily. with food    rosuvastatin (Crestor) 10 mg tablet Take 1 tablet (10 mg) by mouth once daily.     No current facility-administered medications on file prior to visit.        Surgical History  She has a past surgical history that includes Other surgical history (04/01/2015); Tubal ligation (04/01/2015); Mouth surgery (03/20/2017); Tonsillectomy (03/20/2017); Other surgical history (03/20/2017); Colonoscopy (03/20/2017); CT angio neck (11/15/2021); Adenoidectomy; Back surgery; and Spine surgery.     Social History  She reports that she has quit smoking. Her smoking use included cigarettes. She has a 22.50 pack-year smoking history. She has quit using smokeless tobacco. She reports current alcohol use of about 3.0 - 6.0 standard drinks of alcohol per week. She reports that she does not use drugs.    Family History  Family History   Problem Relation Name Age of Onset    Heart disease Father Ilir         Allergies  Amoxicillin and Penicillins    Immunizations  Immunization History   Administered Date(s) Administered    Flu vaccine (IIV4), preservative free *Check age/dose* 10/06/2017, 10/14/2021, 10/18/2022    Influenza, Unspecified 09/12/2012    Influenza, seasonal, injectable 10/07/2016    Moderna SARS-CoV-2 Vaccination 01/04/2021, 02/01/2021    Pneumococcal conjugate vaccine, 13-valent (PREVNAR 13) 11/08/2017    Pneumococcal polysaccharide vaccine, 23-valent, age 2 years and older (PNEUMOVAX 23) 12/22/2010    Tdap vaccine, age 7 year and older (BOOSTRIX, ADACEL)  "11/14/2018    Zoster vaccine, recombinant, adult (SHINGRIX) 11/03/2020        ROS  Negative, except as discussed in HPI     Vitals  /80   Pulse 86   Temp 35.1 °C (95.2 °F)   Ht 1.6 m (5' 3\")   Wt 74.6 kg (164 lb 6.4 oz)   SpO2 99%   BMI 29.12 kg/m²      Physical Exam  Vitals and nursing note reviewed.   Constitutional:       Appearance: Normal appearance.   Pulmonary:      Effort: Pulmonary effort is normal. No respiratory distress.   Abdominal:      General: There is no distension.      Palpations: Abdomen is soft. There is no mass.      Tenderness: There is no abdominal tenderness. There is no guarding or rebound.   Skin:     General: Skin is warm and dry.   Neurological:      Mental Status: She is alert.         Relevant Results  Lab Results   Component Value Date    WBC 5.5 11/15/2022    WBC 7.2 11/08/2021    HGB 12.8 11/15/2022    HGB 12.6 11/08/2021    HCT 38.8 11/15/2022    HCT 42.0 11/08/2021    MCV 91 11/15/2022    MCV 92 11/08/2021     11/15/2022     11/08/2021     Lab Results   Component Value Date     01/15/2024     (L) 01/09/2024    K 4.1 01/15/2024    K 3.7 01/09/2024    CL 93 (L) 01/15/2024    CL 89 (L) 01/09/2024    CO2 32 01/15/2024    CO2 27 01/09/2024    BUN 15 01/15/2024    BUN 16 01/09/2024    CREATININE 0.92 01/15/2024    CREATININE 0.91 01/09/2024    CALCIUM 9.7 01/15/2024    CALCIUM 9.5 01/09/2024    PROT 7.5 01/09/2024    PROT 7.5 11/15/2022    BILITOT 0.4 01/09/2024    BILITOT 0.4 11/15/2022    ALKPHOS 83 01/09/2024    ALKPHOS 75 11/15/2022    ALT 35 01/09/2024    ALT 27 11/15/2022    AST 55 (H) 01/09/2024    AST 34 11/15/2022    GLUCOSE 107 (H) 01/15/2024    GLUCOSE 70 (L) 01/09/2024     Lab Results   Component Value Date    HGBA1C 5.2 11/15/2022     Lab Results   Component Value Date    TSH 1.73 01/09/2024    FREET4 1.43 11/15/2022      Lab Results   Component Value Date    CHOL 185 01/09/2024    TRIG 70 01/09/2024    .9 01/09/2024       "     Assessment/Plan   Selam was seen today for abdominal pain and weight loss.  Diagnoses and all orders for this visit:  Abdominal bloating (Primary)  -     XR abdomen 2 views supine and erect or decub; Future  -     simethicone (Mylicon,Gas-X) 125 mg capsule; Take 1 capsule (125 mg) by mouth every 6 hours if needed for flatulence (or bloating).  BMI 29.0-29.9,adult  Comments:  declines nutrition referral  Orders:  -     semaglutide (Rybelsus) 3 mg tablet; Take 1 tablet (3 mg) by mouth once daily.       There are no discontinued medications.     Counseling:   Medication education:   -Education:  The patient is counseled regarding potential side-effects of any and all new medications  -Understanding:  Patient expressed understanding of information discussed today  -Adherence:  No barriers to adherence identified    Final treatment plan is a result of shared decision making with patient.         Samuel Colby MD

## 2024-03-12 ENCOUNTER — HOSPITAL ENCOUNTER (OUTPATIENT)
Dept: RADIOLOGY | Facility: CLINIC | Age: 64
Discharge: HOME | End: 2024-03-12
Payer: COMMERCIAL

## 2024-03-12 DIAGNOSIS — R14.0 ABDOMINAL BLOATING: ICD-10-CM

## 2024-03-12 PROCEDURE — 74019 RADEX ABDOMEN 2 VIEWS: CPT | Performed by: RADIOLOGY

## 2024-03-12 PROCEDURE — 74019 RADEX ABDOMEN 2 VIEWS: CPT

## 2024-03-29 DIAGNOSIS — J30.2 SEASONAL ALLERGIES: ICD-10-CM

## 2024-03-29 NOTE — TELEPHONE ENCOUNTER
----- Message from Selam Eubanks sent at 3/28/2024  4:44 PM EDT -----  Regarding: Refill   Contact: [phone]  Please refill rx for fluticasone

## 2024-03-31 RX ORDER — FLUTICASONE PROPIONATE 50 MCG
1 SPRAY, SUSPENSION (ML) NASAL DAILY
Qty: 16 G | Refills: 1 | Status: SHIPPED | OUTPATIENT
Start: 2024-03-31

## 2024-04-06 ENCOUNTER — PATIENT MESSAGE (OUTPATIENT)
Dept: PRIMARY CARE | Facility: CLINIC | Age: 64
End: 2024-04-06
Payer: COMMERCIAL

## 2024-04-10 RX ORDER — SEMAGLUTIDE 0.25 MG/.5ML
0.25 INJECTION, SOLUTION SUBCUTANEOUS
Qty: 0.5 ML | Refills: 0 | Status: SHIPPED | OUTPATIENT
Start: 2024-04-14 | End: 2024-06-05 | Stop reason: CLARIF

## 2024-04-11 ENCOUNTER — ALLIED HEALTH (OUTPATIENT)
Dept: INTEGRATIVE MEDICINE | Facility: CLINIC | Age: 64
End: 2024-04-11
Payer: COMMERCIAL

## 2024-04-11 DIAGNOSIS — M54.59 OTHER LOW BACK PAIN: Primary | ICD-10-CM

## 2024-04-11 PROCEDURE — 99214 OFFICE O/P EST MOD 30 MIN: CPT | Performed by: PHYSICIAN ASSISTANT

## 2024-04-11 NOTE — PROGRESS NOTES
62 y/o female with PMH HTN, HLD, Huizar's esophagus, anxiety, OA, RA presents for MEDICARE ACUPUNCTURE EXAM.  Accompanied by , Ambrosio.     Somatic Complaints:  - LBP- across b/l lumbar region, occ spasms. Associated with LLE sciatica, b/l hip pain. Weakness on L side since surgery.  No stool incontinence, urinary retention, saddle anesthesia.  - Aggravated by prolonged standing, walking (>15 minutes)  - Pain treatment: chiropractor, PT (worsened pain), TENS unit, massager, topical Biofreeze  - Balance has been poor since surgery.     Explained briefly what acupuncture is and how it can be helpful for back pain. Recommend six once weekly visits of acupuncture as trial to see if this improves their back pain. Recommend continue more treatments if it is helpful. Discussed risks of acupuncture which include bleeding, bruising, dizziness. Recommend to eat and drink prior to acupuncture appointments. Patient questions answered.       Assessment/Plan:  Referral to acupuncture      Review of Systems:  Constitutional: afebrile  Respiratory: no shortness of breath  Cardiovascular: no chest  pain  Abdominal: no abdominal pain, no n/v/d  Musculoskeletal: + Chronic low back pain with bilateral extremity radiculopathy  Skin: no rash      Physical Exam:   General: alert and oriented; well-developed, well-nourished, no acute distress  HEENT: normocephalic, atraumatic, good eye contact  Respiratory: no labored breathing, no conversational dyspnea  Musculoskeletal: No midline spinal tenderness, tenderness to palpation along bilateral paraspinals of the lumbar spine, +5+5 strength bilateral lower extremities throughout, sensitivity to light touch intact bilaterally throughout; limited range of motion of the lumbosacral spine with extension  Neurology: normal gait  Psych: pleasant affect, cooperative

## 2024-04-15 ENCOUNTER — ALLIED HEALTH (OUTPATIENT)
Dept: INTEGRATIVE MEDICINE | Facility: CLINIC | Age: 64
End: 2024-04-15
Payer: COMMERCIAL

## 2024-04-15 DIAGNOSIS — M54.59 OTHER LOW BACK PAIN: Primary | ICD-10-CM

## 2024-04-15 PROCEDURE — 97811 ACUP 1/> W/O ESTIM EA ADD 15: CPT | Performed by: PHYSICIAN ASSISTANT

## 2024-04-15 PROCEDURE — 97810 ACUP 1/> WO ESTIM 1ST 15 MIN: CPT | Performed by: PHYSICIAN ASSISTANT

## 2024-04-15 NOTE — PROGRESS NOTES
"Acupuncture Visit:     Subjective   Patient ID: Selam Eubanks is a 63 y.o. female who presents for Back Pain  2024 Acupuncture Benefits                         Goes by \"[email or web address]\"  02/15/2024 [facility] # [id number]   Prior Authorization # [id number]   Approved Dates: 4/15/2024-7/15/2024   Sup Provider : LOUISE CALDWELL : An additional 8 days if patient is showing improvement may be requested)   1/ 12 visits in 90 days       States she has surgery in 2020 on her low back with no relief  Sx include constant low back pain, L lower leg numbness, can occasionally radiate to L hip.  Numbness of R foot started 2 mos ago  Worse walking > 15-20 min, vacuuming, washing bath tub  Better biofreeze, TENS 2-3x/wk  Pain level 7/10, goes up to 9/10  Denies Rx or OTC pn relievers.  States she is unable to do chiro due to surgery, not advised per chiro.  Sleep is fair  Neuropathy LEFT sided whole foot, inner lower leg, feels tingling numbness, some burning, \"jolts randomly 2x/wk\"   Med hx RA, HTN, eczema    States all organs intact        Session Information  Is this acupuncture treatment being billed to the patient's insurance company: Yes  This is visit number: 1  The patient has a total number of visits of: 12  Initial Acupuncture Treatment date: 04/15/24  Last Treatment date: 07/15/24  Name of Insurance Company: [employer]  Name of Supervising Physician: JAVI         Review of Systems         Provider reviewed plan for the acupuncture session, precautions and contraindications. Patient/guardian/hospital staff has given consent to treat with full understanding of what to expect during the session. Before acupuncture began, provider explained to the patient to communicate at any time if the procedure was causing discomfort past their tolerance level. Patient agreed to advise acupuncturist. The acupuncturist counseled the patient on the risks of acupuncture treatment including pain, infection, bleeding, and no relief of pain. The " patient was positioned comfortably. There was no evidence of infection at the site of needle insertions.    Objective   Physical Exam              Acupuncture Treatment  Body Points - Left: SP 6, Lv 8-T, UB 62  Body Points - Bilateral: Kd 6, 7, GB 34, Lv 3, LI 4  Body Points - Right: 22.06  Needle Count In: 14  Needle Count Out: 14  Total Face to Face Time (min): 30 minutes              Assessment/Plan        family/patient

## 2024-05-06 ENCOUNTER — ALLIED HEALTH (OUTPATIENT)
Dept: INTEGRATIVE MEDICINE | Facility: CLINIC | Age: 64
End: 2024-05-06
Payer: COMMERCIAL

## 2024-05-06 DIAGNOSIS — M54.59 OTHER LOW BACK PAIN: Primary | ICD-10-CM

## 2024-05-06 PROCEDURE — 97811 ACUP 1/> W/O ESTIM EA ADD 15: CPT | Performed by: PHYSICIAN ASSISTANT

## 2024-05-06 PROCEDURE — 97810 ACUP 1/> WO ESTIM 1ST 15 MIN: CPT | Performed by: PHYSICIAN ASSISTANT

## 2024-05-06 NOTE — TELEPHONE ENCOUNTER
----- Message from Selam Eubanks sent at 5/6/2024  9:44 AM EDT -----  Regarding: [place]  Contact: [phone]  I have 4 days left can you please send a refill     Also I did make appointment with GI, the discomfort and tightness persists increasing discomfort. Can’t get in till end of June hoping they call with cancellation.   Thank you

## 2024-05-06 NOTE — PROGRESS NOTES
"Acupuncture Visit:     Subjective   Patient ID: Selam Eubanks is a 63 y.o. female who presents for Back Pain  2024 Acupuncture Benefits                         Goes by \"CUAUHTEMOC\"  02/15/2024 [facility] # [id number]   Prior Authorization # [id number]   Approved Dates: 4/15/2024-7/15/2024   Sup Provider : LOUISE CALDWELL : An additional 8 days if patient is showing improvement may be requested)   2/ 12 visits in 90 days     No change in sx.    Initial  States she has surgery in 2020 on her low back with no relief  Sx include constant low back pain, L lower leg numbness, can occasionally radiate to L hip.  Numbness of R foot started 2 mos ago  Worse walking > 15-20 min, vacuuming, washing bath tub  Better biofreeze, TENS 2-3x/wk  Pain level 7/10, goes up to 9/10  Denies Rx or OTC pn relievers.  States she is unable to do chiro due to surgery, not advised per chiro.  Sleep is fair  Neuropathy LEFT sided whole foot, inner lower leg, feels tingling numbness, some burning, \"jolts randomly 2x/wk\"   Med hx RA, HTN, eczema    States all organs intact        Session Information  Is this acupuncture treatment being billed to the patient's insurance company: Yes  This is visit number: 2  The patient has a total number of visits of: 12  Initial Acupuncture Treatment date: 04/15/24  Name of Insurance Company: [employer]  Name of Supervising Physician: JAVI         Review of Systems         Provider reviewed plan for the acupuncture session, precautions and contraindications. Patient/guardian/hospital staff has given consent to treat with full understanding of what to expect during the session. Before acupuncture began, provider explained to the patient to communicate at any time if the procedure was causing discomfort past their tolerance level. Patient agreed to advise acupuncturist. The acupuncturist counseled the patient on the risks of acupuncture treatment including pain, infection, bleeding, and no relief of pain. The " patient was positioned comfortably. There was no evidence of infection at the site of needle insertions.    Objective   Physical Exam              Acupuncture Treatment  Body Points - Left: GB 34, Sp 10  Body Points - Bilateral: Kd 3, HTJJ L3-S1, Ub 23, 25, 60, 62  Body Points - Right: 22.16, 22.06  Other Techniques Utilized: TDP Lamp  TDP Lamp Descripton: low back with 4 moxa  Needle Count In: 22  Needle Count Out: 22  Total Face to Face Time (min): 30 minutes              Assessment/Plan

## 2024-05-09 ENCOUNTER — APPOINTMENT (OUTPATIENT)
Dept: GASTROENTEROLOGY | Facility: CLINIC | Age: 64
End: 2024-05-09
Payer: COMMERCIAL

## 2024-05-13 ENCOUNTER — ALLIED HEALTH (OUTPATIENT)
Dept: INTEGRATIVE MEDICINE | Facility: CLINIC | Age: 64
End: 2024-05-13
Payer: COMMERCIAL

## 2024-05-13 DIAGNOSIS — M54.59 OTHER LOW BACK PAIN: Primary | ICD-10-CM

## 2024-05-13 PROCEDURE — 97811 ACUP 1/> W/O ESTIM EA ADD 15: CPT | Performed by: PHYSICIAN ASSISTANT

## 2024-05-13 PROCEDURE — 97810 ACUP 1/> WO ESTIM 1ST 15 MIN: CPT | Performed by: ACUPUNCTURIST

## 2024-05-13 NOTE — PROGRESS NOTES
"Acupuncture Visit:     Subjective   Patient ID: Selam Eubanks is a 63 y.o. female who presents for No chief complaint on file.  2024 Acupuncture Benefits                         Goes by \"CUAUHTEMOC\"  02/15/2024 RA   [facility] # [id number]   Prior Authorization # [id number]   Approved Dates: 4/15/2024-7/15/2024   Sup Provider : LOUISE CALDWELL : An additional 8 days if patient is showing improvement may be requested)   2/ 12 visits in 90 days       Notes LBP felt better p tx   5/10 pain level today   Energy level was up a little bit   No change in BM's or sleep      Initial  States she has surgery in 2020 on her low back with no relief  Sx include constant low back pain, L lower leg numbness, can occasionally radiate to L hip.  Numbness of R foot started 2 mos ago  Worse walking > 15-20 min, vacuuming, washing bath tub  Better biofreeze, TENS 2-3x/wk  Pain level 7/10, goes up to 9/10  Denies Rx or OTC pn relievers.  States she is unable to do chiro due to surgery, not advised per chiro.  Sleep is fair  Neuropathy LEFT sided whole foot, inner lower leg, feels tingling numbness, some burning, \"jolts randomly 2x/wk\"   Med hx RA, HTN, eczema    States all organs intact        Session Information  Is this acupuncture treatment being billed to the patient's insurance company: Yes  This is visit number: 3  The patient has a total number of visits of: 12  Initial Acupuncture Treatment date: 04/15/24  Last Treatment date: 07/15/24  Name of Insurance Company: [employer]  Name of Supervising Physician: JAVI  Visit Type: Follow-up visit  Description of present complaint: Chronic pain         Review of Systems         Provider reviewed plan for the acupuncture session, precautions and contraindications. Patient/guardian/hospital staff has given consent to treat with full understanding of what to expect during the session. Before acupuncture began, provider explained to the patient to communicate at any time if the procedure was " causing discomfort past their tolerance level. Patient agreed to advise acupuncturist. The acupuncturist counseled the patient on the risks of acupuncture treatment including pain, infection, bleeding, and no relief of pain. The patient was positioned comfortably. There was no evidence of infection at the site of needle insertions.    Objective   Physical Exam              Acupuncture Treatment  Patient Position: Lateral recumbent on a table  Acupuncture Needling: Yes  Body Points - Left: BL64 GB43 - DB/LG 22.06 SI3  Body Points - Bilateral: BL18 BL19 BL20 BL21 BL22 [id number] BL25 - DU20  TDP Lamp Descripton: low back with 4 moxa  Needle Count In: 23  Needle Count Out: 23  Needle Retention Time (min): 30 minutes  Total Face to Face Time (min): 30 minutes              Assessment/Plan

## 2024-05-20 ENCOUNTER — ALLIED HEALTH (OUTPATIENT)
Dept: INTEGRATIVE MEDICINE | Facility: CLINIC | Age: 64
End: 2024-05-20
Payer: COMMERCIAL

## 2024-05-20 DIAGNOSIS — M54.59 OTHER LOW BACK PAIN: Primary | ICD-10-CM

## 2024-05-20 PROCEDURE — 97810 ACUP 1/> WO ESTIM 1ST 15 MIN: CPT | Performed by: PHYSICIAN ASSISTANT

## 2024-05-20 PROCEDURE — 97811 ACUP 1/> W/O ESTIM EA ADD 15: CPT | Performed by: PHYSICIAN ASSISTANT

## 2024-05-20 NOTE — PROGRESS NOTES
"Acupuncture Visit:     Subjective   Patient ID: Selam Eubanks is a 63 y.o. female who presents for Back Pain  2024 Acupuncture Benefits                         Goes by \"CUAUHTEMOC\"  02/15/2024 RA   [facility] # [id number]   Prior Authorization # [id number]   Approved Dates: 4/15/2024-7/15/2024   Sup Provider : LOUISE CALDWELL : An additional 8 days if patient is showing improvement may be requested)   4 / 12 visits in 90 days     States she has been able to do more physically with less pain.  L foot no change-numbness.    prev  Notes LBP felt better p tx   5/10 pain level today   Energy level was up a little bit   No change in BM's or sleep      Initial  States she has surgery in 2020 on her low back with no relief  Sx include constant low back pain, L lower leg numbness, can occasionally radiate to L hip.  Numbness of R foot started 2 mos ago  Worse walking > 15-20 min, vacuuming, washing bath tub  Better biofreeze, TENS 2-3x/wk  Pain level 7/10, goes up to 9/10  Denies Rx or OTC pn relievers.  States she is unable to do chiro due to surgery, not advised per chiro.  Sleep is fair  Neuropathy LEFT sided whole foot, inner lower leg, feels tingling numbness, some burning, \"jolts randomly 2x/wk\"   Med hx RA, HTN, eczema    States all organs intact        Session Information  This is visit number: 4  The patient has a total number of visits of: 12  Initial Acupuncture Treatment date: 04/15/24  Name of Insurance Company: [employer]  Name of Supervising Physician: JAVI         Review of Systems         Provider reviewed plan for the acupuncture session, precautions and contraindications. Patient/guardian/hospital staff has given consent to treat with full understanding of what to expect during the session. Before acupuncture began, provider explained to the patient to communicate at any time if the procedure was causing discomfort past their tolerance level. Patient agreed to advise acupuncturist. The acupuncturist counseled " the patient on the risks of acupuncture treatment including pain, infection, bleeding, and no relief of pain. The patient was positioned comfortably. There was no evidence of infection at the site of needle insertions.    Objective   Physical Exam              Acupuncture Treatment  Patient Position: Lateral recumbent on a table  Body Points - Left: GB 38, 43 -  22.06 SI3  Body Points - Bilateral: Du 2, 20, BL18-20,  23-26  Body Points - Right: 22.16  TDP Lamp Descripton: low back with 4 moxa  Needle Count In: 21  Needle Count Out: 21  Total Face to Face Time (min): 30 minutes              Assessment/Plan

## 2024-05-24 ENCOUNTER — APPOINTMENT (OUTPATIENT)
Dept: INTEGRATIVE MEDICINE | Facility: CLINIC | Age: 64
End: 2024-05-24
Payer: COMMERCIAL

## 2024-05-28 ENCOUNTER — APPOINTMENT (OUTPATIENT)
Dept: OTOLARYNGOLOGY | Facility: CLINIC | Age: 64
End: 2024-05-28
Payer: COMMERCIAL

## 2024-05-29 ENCOUNTER — PATIENT MESSAGE (OUTPATIENT)
Dept: PRIMARY CARE | Facility: CLINIC | Age: 64
End: 2024-05-29
Payer: COMMERCIAL

## 2024-05-31 ENCOUNTER — OFFICE VISIT (OUTPATIENT)
Dept: GASTROENTEROLOGY | Facility: CLINIC | Age: 64
End: 2024-05-31
Payer: COMMERCIAL

## 2024-05-31 VITALS — WEIGHT: 158 LBS | HEIGHT: 63 IN | BODY MASS INDEX: 28 KG/M2 | HEART RATE: 87 BPM

## 2024-05-31 DIAGNOSIS — K22.70 BARRETT'S ESOPHAGUS WITHOUT DYSPLASIA: ICD-10-CM

## 2024-05-31 DIAGNOSIS — K21.9 GASTROESOPHAGEAL REFLUX DISEASE, UNSPECIFIED WHETHER ESOPHAGITIS PRESENT: Primary | ICD-10-CM

## 2024-05-31 DIAGNOSIS — R14.0 BLOATING: ICD-10-CM

## 2024-05-31 DIAGNOSIS — K59.09 CHRONIC CONSTIPATION: ICD-10-CM

## 2024-05-31 PROCEDURE — 3008F BODY MASS INDEX DOCD: CPT | Performed by: INTERNAL MEDICINE

## 2024-05-31 PROCEDURE — 99214 OFFICE O/P EST MOD 30 MIN: CPT | Performed by: INTERNAL MEDICINE

## 2024-05-31 PROCEDURE — 1123F ACP DISCUSS/DSCN MKR DOCD: CPT | Performed by: INTERNAL MEDICINE

## 2024-05-31 PROCEDURE — 1160F RVW MEDS BY RX/DR IN RCRD: CPT | Performed by: INTERNAL MEDICINE

## 2024-05-31 PROCEDURE — 1159F MED LIST DOCD IN RCRD: CPT | Performed by: INTERNAL MEDICINE

## 2024-05-31 PROCEDURE — 1157F ADVNC CARE PLAN IN RCRD: CPT | Performed by: INTERNAL MEDICINE

## 2024-05-31 NOTE — PROGRESS NOTES
REASON FOR VISIT: Discuss abdominal symptoms    HPI:  Selam Eubanks is a 63 y.o. female with a past medical history of GERD and short segment nondysplastic Huizar's esophagus which was negative on last upper endoscopy 4 years ago being evaluated in the office for abdominal symptoms of dyspepsia.  Reports significant fullness and bloating frequently.  Feels that it started after COVID almost a year ago.  No nausea or vomiting.  Has history of chronic constipation.  Avoids milk products.  No rectal bleeding.  Colonoscopy last year without colon polyps.          PRIOR ENDOSCOPY    PAST MEDICAL HISTORY  Past Medical History:   Diagnosis Date    Abnormal finding of blood chemistry, unspecified 2018    Abnormal blood chemistry    Abnormal levels of other serum enzymes 2015    Abnormal AST and ALT    Acute bronchitis, unspecified 2019    Acute exacerbation of chronic bronchitis    Acute bronchitis, unspecified 2018    Acute bronchitis due to infection    Acute maxillary sinusitis, unspecified 2019    Acute maxillary sinusitis    Acute maxillary sinusitis, unspecified 2019    Acute maxillary sinusitis    Acute upper respiratory infection, unspecified 2020    Viral URI with cough    Allergic     Anxiety     Arthritis     Asthma ([facility])     Huizar's esophagus without dysplasia 2017    Huizar's esophagus without dysplasia    Huizar's esophagus without dysplasia 2016    Huizar's esophagus without dysplasia    Huizar's esophagus without dysplasia 2016    Huizar's esophagus    Chronic maxillary sinusitis 2019    Maxillary sinusitis    Encounter for full-term uncomplicated delivery ([facility])      (spontaneous vaginal delivery)    GERD (gastroesophageal reflux disease)     Hypertension     Other conditions influencing health status     Complete Colonoscopy    Other general symptoms and signs 2020    Influenza-like symptoms    Other long term  (current) drug therapy 10/21/2020    Chronic use of benzodiazepine for therapeutic purpose    Other specified anxiety disorders 07/25/2016    Depression with anxiety    Personal history of other diseases of the digestive system 03/29/2016    History of esophageal reflux    Personal history of other diseases of the digestive system 11/30/2015    History of hiatal hernia    Personal history of other diseases of the digestive system 09/22/2020    History of gastroesophageal reflux (GERD)    Personal history of other diseases of the musculoskeletal system and connective tissue 08/25/2022    History of rheumatoid arthritis    Personal history of other diseases of the nervous system and sense organs     History of blepharitis    Personal history of other diseases of the nervous system and sense organs 05/14/2014    History of carpal tunnel syndrome    Personal history of other diseases of the respiratory system 09/20/2017    History of sinusitis    Personal history of other diseases of the respiratory system 09/16/2019    History of acute bronchitis with bronchospasm    Personal history of other diseases of the respiratory system 03/12/2020    History of influenza    Personal history of other diseases of the respiratory system 10/21/2018    History of bronchitis    Personal history of other specified conditions 02/02/2018    History of persistent cough    Pleurodynia 02/02/2018    Rib pain on left side    Radiculopathy, lumbosacral region 07/30/2020    Lumbosacral radiculitis    Spinal stenosis, lumbar region without neurogenic claudication 06/05/2020    Stenosis of lateral recess of lumbar spine    Trigger finger, right ring finger 08/20/2019    Trigger ring finger of right hand    Trigger finger, unspecified finger 05/15/2013    Trigger finger, acquired    Vitamin D deficiency, unspecified 07/25/2016    Vitamin D deficiency       PAST SURGICAL HISTORY  Past Surgical History:   Procedure Laterality Date    ADENOIDECTOMY       BACK SURGERY      COLONOSCOPY  03/20/2017    Colonoscopy (Fiberoptic)    CT ANGIO NECK  11/15/2021    CT NECK ANGIO W AND WO IV CONTRAST 11/15/2021 [facility]    MOUTH SURGERY  03/20/2017    Oral Surgery Tooth Extraction    OTHER SURGICAL HISTORY  04/01/2015    Drainage Of Ovarian Cyst(S)    OTHER SURGICAL HISTORY  03/20/2017    Endoscopy - Papillotomy    SPINE SURGERY      TONSILLECTOMY  03/20/2017    Tonsillectomy With Adenoidectomy    TUBAL LIGATION  04/01/2015    Tubal Ligation       FAMILY HISTORY  Family History   Problem Relation Name Age of Onset    Heart disease Father Ilir        SOCIAL HISTORY  Social History     Tobacco Use    Smoking status: Former     Current packs/day: 1.50     Average packs/day: 1.5 packs/day for 15.0 years (22.5 ttl pk-yrs)     Types: Cigarettes    Smokeless tobacco: Former   Substance Use Topics    Alcohol use: Yes     Alcohol/week: 3.0 - 6.0 standard drinks of alcohol     Types: 3 - 6 Standard drinks or equivalent per week       REVIEW OF SYSTEMS  CONSTITUTIONAL: negative for fever, chills, fatigue, or unintentional weight loss,   HEENT: negative for icteric sclera, eye pain/redness, or changes in vision/hearing  RESPIRATORY: negative for cough, hemoptysis, wheezing, orthopnea, or dyspnea on exertion  CARDIOVASCULAR: negative for chest pain, palpitations, or syncope   GASTROINTESTINAL: as noted per HPI  GENITOURINARY: negative for dysuria, polyuria, incontinence, or hematuria  MUSCULOSKELETAL: negative for arthralgia, myalgia, or joint swelling/stiffness   INTEGUMENTARY/SKIN: negative jaundice, rash, or skin lesion  HEMATOLOGIC/LYMPHATIC: negative for prolonged bleeding, easy bruising, or swollen lymph nodes  ENDOCRINE: negative for cold/heat intolerance, polydipsia, polyuria, or goiter  NEUROLOGIC: negative for headaches, dizziness, tremor, or gait abnormality  PSYCHIATRIC: negative for anxiety, depression, personality changes, or sleep disturbances      A 10  point review of systems was completed and was otherwise negative.    ALLERGIES  Allergies   Allergen Reactions    Amoxicillin Hives and Swelling    Penicillins Hives       MEDICATIONS  Current Outpatient Medications   Medication Sig Dispense Refill    albuterol (Proventil HFA) 90 mcg/actuation inhaler Inhale 2 puffs every 6 hours if needed for wheezing.      albuterol 2.5 mg /3 mL (0.083 %) nebulizer solution Take 3 mL (2.5 mg) by nebulization 4 times a day as needed for wheezing or shortness of breath. 9 mL 11    aspirin 81 mg EC tablet Take 1 tablet (81 mg) by mouth once daily.      fluticasone (Flonase) 50 mcg/actuation nasal spray Administer 1 spray into each nostril once daily. 16 g 1    folic acid (Folvite) 1 mg tablet TAKE TWO TABLETS BY MOUTH once a  tablet 1    hydroCHLOROthiazide (HYDRODiuril) 25 mg tablet Take 1 tablet (25 mg) by mouth once daily.      hydroxychloroquine (Plaquenil) 200 mg tablet TAKE ONE TABLET BY MOUTH TWO TIMES A  tablet 1    losartan (Cozaar) 50 mg tablet Take 1 tablet (50 mg) by mouth once daily. 90 tablet 1    montelukast (Singulair) 10 mg tablet Take 1 tablet (10 mg) by mouth once every 24 hours. 90 tablet 3    multivitamin tablet Take 1 tablet by mouth once daily.      omeprazole (PriLOSEC) 20 mg DR capsule Take 1 capsule (20 mg) by mouth. Do not crush or chew.      potassium chloride CR 10 mEq ER tablet Take 1 tablet (10 mEq) by mouth once daily. with food      rosuvastatin (Crestor) 10 mg tablet Take 1 tablet (10 mg) by mouth once daily. 90 tablet 3    semaglutide (Rybelsus) 7 mg tablet Take 1 tablet (7 mg) by mouth once daily. 30 tablet 2    semaglutide, weight loss, (Wegovy) 0.25 mg/0.5 mL pen injector Inject 0.25 mg under the skin 1 (one) time per week. 0.5 mL 0    simethicone (Mylicon,Gas-X) 125 mg capsule Take 1 capsule (125 mg) by mouth every 6 hours if needed for flatulence (or bloating). 28 capsule 0     No current facility-administered medications for this  "visit.       VITALS  Pulse 87   Ht 1.6 m (5' 3\")   Wt 71.7 kg (158 lb)   BMI 27.99 kg/m²      PHYSICAL EXAM  Alert and oriented in no acute distress  Abdomen soft, no focal tenderness to palpation in all 4 quadrants    ASSESSMENT/ PLAN  Patient with history of nondysplastic short segment Huizar's esophagus negative on last upper endoscopy.  History of GERD.  Constipation.  Significant bloating intermittently and history of chronic constipation.  Using MiraLAX currently about once a week.  Recommend increasing MiraLAX to 2-3 times a week as constipation may be adding to the symptoms.  Recommended trial of low FODMAP diet.  I suspect also possible underlying postinfectious functional dyspepsia given timing of the symptoms.  No significant unintentional weight loss at this time.  She will follow-up with me if symptoms persist in which case can consider possible imaging.  Also possible EGD given history of nondysplastic Huizar's esophagus.  No current dysphagia.  She is in agreement with the plan.        Signature: Tabitha Douglas MD    Date: 5/31/2024  Time: 3:04 PM    "

## 2024-06-03 ENCOUNTER — APPOINTMENT (OUTPATIENT)
Dept: INTEGRATIVE MEDICINE | Facility: CLINIC | Age: 64
End: 2024-06-03
Payer: COMMERCIAL

## 2024-06-05 RX ORDER — SEMAGLUTIDE 1.34 MG/ML
0.25 INJECTION, SOLUTION SUBCUTANEOUS
Qty: 1 EACH | Refills: 0 | Status: SHIPPED | OUTPATIENT
Start: 2024-06-09 | End: 2024-07-09

## 2024-06-11 ENCOUNTER — APPOINTMENT (OUTPATIENT)
Dept: RHEUMATOLOGY | Facility: CLINIC | Age: 64
End: 2024-06-11
Payer: COMMERCIAL

## 2024-06-17 DIAGNOSIS — E78.5 HYPERLIPIDEMIA, UNSPECIFIED HYPERLIPIDEMIA TYPE: ICD-10-CM

## 2024-06-17 RX ORDER — ROSUVASTATIN CALCIUM 10 MG/1
10 TABLET, COATED ORAL DAILY
Qty: 90 TABLET | Refills: 3 | Status: SHIPPED | OUTPATIENT
Start: 2024-06-17 | End: 2025-06-17

## 2024-06-17 NOTE — TELEPHONE ENCOUNTER
[facility] ins is requesting refill on populated medication. Pt had a physical on 1/08/24.   PCP: Rodrigue Lu DO     Chief complaint: Patient presents today for follow up of dysautonomia/POTS    Assessment:   · Dysautonomia, auto lab 7/2016 showing mild dysautonomia with mild focal abnormality of postganglionic sudomotor function on the leg of unclear clinical significance, mildly impaired cardiogaval function  · POTS, (+) tilt 8/2016, negative AB, stable on metoprolol  · Orthostatic lightheadedness, mild OI per auto lab 7/2016  · Syncope, none since starting metoprolol  · Chronic fatigue and insomnia  · Cognitive complaints  · Hyperhydrosis  · Hx of DVT, on chronic AC with warfarin  · Flushing reactions - elevated histamine initially in 6/2016 but urine histamine 1/2017 normal, tryptase normal, following with Dr Dsouza      Plan:   · Recommend consult with Dr Lau. Order previously placed in 9/2016. Patient is willing to follow-up with her. Dr Lau's contact information provided to patient via AVS.  · No medication changes recommended as VS are stable.  · Return to clinic in 6 months.      Meds tried previously:  · None other than current meds      HPI:  Today is a good day  She's had several bad days lately  Fatigue is biggest complaint  Gets 5.5-6 hours of sleep a night  She has trouble falling asleep and staying asleep  Experiences what she says might be dreams or hallucinations     Dizziness is improved  Says she's trained herself how to avoid it  Has been getting up slowly  No syncope  Stumbling/imbalance issues have improved since training herself with her dizziness     Does chores with her chickens and is very active outside    Does have cold intolerance    ROS:   RESPIRATORY: no shortness of breath  CARDIOVASCULAR: no chest pain, no palpitations  NEUROLOGICAL: see HPI    See RN/MA progress note for full dysautonomia questionnaire.      Physical Exam:   - CONSTITUTIONAL: Alert, well-appearing, no acute distress  Visit Vitals   • /80 (Patient Position: Standing)  Comment  (Patient Position): 2 minutes   • Pulse 69   • SpO2 97%     - RESPIRATORY: Movements symmetrical, no use of accessory muscles, clear to auscultation bilaterally    - CARDIOVASCULAR: No edema, regular rhythm and rate, S1S2, no murmurs or rub  - NEUROLOGIC: No focal deficits  - MUSCULOSKELETAL: Moves bilateral upper and lower extremities without difficulty  - SKIN: Warm and dry  - PSYCHIATRIC: Oriented x 3, appropriate mood and affect, memory intact      I, Vickie Zhang NP attest that I performed the duties of a scribe for this encounter in the presence of Dr. Woods.           The documentation recorded by the nurse/MA accurately and completely reflects the service(s) I personally performed and the decisions made by me.

## 2024-06-18 ENCOUNTER — APPOINTMENT (OUTPATIENT)
Dept: INTEGRATIVE MEDICINE | Facility: CLINIC | Age: 64
End: 2024-06-18
Payer: COMMERCIAL

## 2024-06-20 ENCOUNTER — APPOINTMENT (OUTPATIENT)
Dept: INTEGRATIVE MEDICINE | Facility: CLINIC | Age: 64
End: 2024-06-20
Payer: COMMERCIAL

## 2024-06-20 DIAGNOSIS — M54.59 OTHER LOW BACK PAIN: Primary | ICD-10-CM

## 2024-06-20 PROCEDURE — 97810 ACUP 1/> WO ESTIM 1ST 15 MIN: CPT | Performed by: PHYSICIAN ASSISTANT

## 2024-06-20 PROCEDURE — 97811 ACUP 1/> W/O ESTIM EA ADD 15: CPT | Performed by: PHYSICIAN ASSISTANT

## 2024-06-20 NOTE — PROGRESS NOTES
"Acupuncture Visit:     Subjective   Patient ID: Selam Eubanks is a 64 y.o. female who presents for Back Pain    2024 Acupuncture Benefits                         Goes by \"CUAUHTEMOC\"  02/15/2024 RA   [facility] # [id number]   Prior Authorization # [id number]   Approved Dates: 4/15/2024-7/15/2024   Sup Provider : LOUISE CALDWELL : An additional 8 days if patient is showing improvement may be requested)   5/ 12 visits in 90 days     Update: 06/20/24  Tx 5/12    Patient reports her pain has been doing \"pretty good.\"  She states sitting in car for long increases numbness in her low back.  Movement helps.  Notes her right side is starting to cause her discomfort.    Previous Visit with LS: 5/20/24    States she has been able to do more physically with less pain.  L foot no change-numbness.    prev  Notes LBP felt better p tx   5/10 pain level today   Energy level was up a little bit   No change in BM's or sleep      Initial  States she has surgery in 2020 on her low back with no relief  Sx include constant low back pain, L lower leg numbness, can occasionally radiate to L hip.  Numbness of R foot started 2 mos ago  Worse walking > 15-20 min, vacuuming, washing bath tub  Better biofreeze, TENS 2-3x/wk  Pain level 7/10, goes up to 9/10  Denies Rx or OTC pn relievers.  States she is unable to do chiro due to surgery, not advised per chiro.  Sleep is fair  Neuropathy LEFT sided whole foot, inner lower leg, feels tingling numbness, some burning, \"jolts randomly 2x/wk\"   Med hx RA, HTN, eczema    States all organs intact        Session Information  This is visit number: 5  The patient has a total number of visits of: 12  Initial Acupuncture Treatment date: 04/15/24  Name of Insurance Company: [employer]  Name of Supervising Physician: JAVI  Visit Type: Follow-up visit  Medical History Reviewed: I have reviewed pertinent medical history in EHR, and no contraindications are present to provide treatment  Description of present " complaint: Chronic pain         Review of Systems         Provider reviewed plan for the acupuncture session, precautions and contraindications. Patient/guardian/hospital staff has given consent to treat with full understanding of what to expect during the session. Before acupuncture began, provider explained to the patient to communicate at any time if the procedure was causing discomfort past their tolerance level. Patient agreed to advise acupuncturist. The acupuncturist counseled the patient on the risks of acupuncture treatment including pain, infection, bleeding, and no relief of pain. The patient was positioned comfortably. There was no evidence of infection at the site of needle insertions.    Objective   Physical Exam    Acupuncture Physical Exam  Visual Inspection: Fasciculation observed on left leg    Treatment Plan  Treatment Goals: Pain management, Wellbeing improvement    Acupuncture Treatment  Patient Position: Lateral recumbent on a table  Acupuncture Needling: Yes  Needle Guage: 36 guage /.20/ Blue seirin  Body Points: With retention  Body Points - Left: GB34,41; BL57,58,62; ST36  Body Points - Bilateral: BL23,52; LV3  Body Points - Right: KD3,7  Auricular Points: No  Electroacupuncture Used: No  Other Techniques Utilized: Topicals  Topicals Description: Po Sum On on low back and left leg  Needle Count In: 14  Needle Count Out: 14  Needle Retention Time (min): 30 minutes  Total Face to Face Time (min): 30 minutes              Assessment/Plan

## 2024-06-21 ENCOUNTER — APPOINTMENT (OUTPATIENT)
Dept: GASTROENTEROLOGY | Facility: CLINIC | Age: 64
End: 2024-06-21
Payer: COMMERCIAL

## 2024-06-24 ENCOUNTER — OFFICE VISIT (OUTPATIENT)
Dept: RHEUMATOLOGY | Facility: CLINIC | Age: 64
End: 2024-06-24
Payer: COMMERCIAL

## 2024-06-24 ENCOUNTER — APPOINTMENT (OUTPATIENT)
Dept: INTEGRATIVE MEDICINE | Facility: CLINIC | Age: 64
End: 2024-06-24
Payer: COMMERCIAL

## 2024-06-24 VITALS — DIASTOLIC BLOOD PRESSURE: 73 MMHG | BODY MASS INDEX: 27.99 KG/M2 | SYSTOLIC BLOOD PRESSURE: 117 MMHG | WEIGHT: 158 LBS

## 2024-06-24 DIAGNOSIS — M06.9 RHEUMATOID ARTHRITIS, INVOLVING UNSPECIFIED SITE, UNSPECIFIED WHETHER RHEUMATOID FACTOR PRESENT (MULTI): Primary | ICD-10-CM

## 2024-06-24 DIAGNOSIS — M19.90 OSTEOARTHRITIS, UNSPECIFIED OSTEOARTHRITIS TYPE, UNSPECIFIED SITE: ICD-10-CM

## 2024-06-24 DIAGNOSIS — K21.9 GASTROESOPHAGEAL REFLUX DISEASE, UNSPECIFIED WHETHER ESOPHAGITIS PRESENT: ICD-10-CM

## 2024-06-24 DIAGNOSIS — M54.59 OTHER LOW BACK PAIN: Primary | ICD-10-CM

## 2024-06-24 DIAGNOSIS — M85.89 OTHER SPECIFIED DISORDERS OF BONE DENSITY AND STRUCTURE, MULTIPLE SITES: ICD-10-CM

## 2024-06-24 DIAGNOSIS — K22.70 BARRETT'S ESOPHAGUS WITHOUT DYSPLASIA: ICD-10-CM

## 2024-06-24 PROCEDURE — 3074F SYST BP LT 130 MM HG: CPT | Performed by: INTERNAL MEDICINE

## 2024-06-24 PROCEDURE — 1123F ACP DISCUSS/DSCN MKR DOCD: CPT | Performed by: INTERNAL MEDICINE

## 2024-06-24 PROCEDURE — 99214 OFFICE O/P EST MOD 30 MIN: CPT | Performed by: INTERNAL MEDICINE

## 2024-06-24 PROCEDURE — 97810 ACUP 1/> WO ESTIM 1ST 15 MIN: CPT | Performed by: INTERNAL MEDICINE

## 2024-06-24 PROCEDURE — 1159F MED LIST DOCD IN RCRD: CPT | Performed by: INTERNAL MEDICINE

## 2024-06-24 PROCEDURE — 1157F ADVNC CARE PLAN IN RCRD: CPT | Performed by: INTERNAL MEDICINE

## 2024-06-24 PROCEDURE — 97811 ACUP 1/> W/O ESTIM EA ADD 15: CPT | Performed by: INTERNAL MEDICINE

## 2024-06-24 PROCEDURE — 3078F DIAST BP <80 MM HG: CPT | Performed by: INTERNAL MEDICINE

## 2024-06-24 PROCEDURE — 3008F BODY MASS INDEX DOCD: CPT | Performed by: INTERNAL MEDICINE

## 2024-06-24 RX ORDER — OMEPRAZOLE 20 MG/1
20 CAPSULE, DELAYED RELEASE ORAL DAILY
Qty: 90 CAPSULE | Refills: 2 | Status: SHIPPED | OUTPATIENT
Start: 2024-06-24

## 2024-06-24 NOTE — PROGRESS NOTES
"HPI - she is gen doing well, although her neuropathy is the most troublesome.  She c/o L \"hip\" pain - mainly at night after she's been walking a lot.  No other pain.  No swelling.  AM stiffness, variable, partic with rainy weather.  No CP, resp, or GI    PE  NAD  RRR no r/m/g  CTA  No edema  No synovitis  NT and no pain with ROM  Troch, buttock NT    A/P - OA and RA, gen doing well  She will call if sx incr  Reviewed CMP.  Check CBC - yrly labs  Check screening DEXA - no parental hip fx  She will be seeing new eye   Reeval 6 mo or sooner PRN    Addendum - She does not meet FRAX criteria for treatment  "

## 2024-06-24 NOTE — PROGRESS NOTES
"Acupuncture Visit:     Subjective   Patient ID: Selam Eubanks is a 64 y.o. female who presents for Back Pain    2024 Acupuncture Benefits                         Goes by \"CUAUHTEMOC\"  02/15/2024 [facility] # [id number]   Prior Authorization # [id number]   Approved Dates: 4/15/2024-7/15/2024   Sup Provider : LOUISE CALDWELL : An additional 8 days if patient is showing improvement may be requested)   5/ 12 visits in 90 days     Update: 06/24/24  Tx 6/12    5/10 pain    Update: 06/20/24  Tx 5/12    Patient reports her pain has been doing \"pretty good.\"  She states sitting in car for long increases numbness in her low back.  Movement helps.  Notes her right side is starting to cause her discomfort.    Previous Visit with LS: 5/20/24    States she has been able to do more physically with less pain.  L foot no change-numbness.    prev  Notes LBP felt better p tx   5/10 pain level today   Energy level was up a little bit   No change in BM's or sleep      Initial  States she has surgery in 2020 on her low back with no relief  Sx include constant low back pain, L lower leg numbness, can occasionally radiate to L hip.  Numbness of R foot started 2 mos ago  Worse walking > 15-20 min, vacuuming, washing bath tub  Better biofreeze, TENS 2-3x/wk  Pain level 7/10, goes up to 9/10  Denies Rx or OTC pn relievers.  States she is unable to do chiro due to surgery, not advised per chiro.  Sleep is fair  Neuropathy LEFT sided whole foot, inner lower leg, feels tingling numbness, some burning, \"jolts randomly 2x/wk\"   Med hx RA, HTN, eczema    States all organs intact        Session Information  This is visit number: 6  The patient has a total number of visits of: 12  Initial Acupuncture Treatment date: 04/15/24  Name of Insurance Company: [employer]  Name of Supervising Physician: Dora Boyd MD PhD  Visit Type: Follow-up visit  Medical History Reviewed: I have reviewed pertinent medical history in EHR, and no contraindications are " present to provide treatment  Description of present complaint: Chronic pain  Since Last Visit, Patient Noted Improved: Chronic Pain         Review of Systems         Provider reviewed plan for the acupuncture session, precautions and contraindications. Patient/guardian/hospital staff has given consent to treat with full understanding of what to expect during the session. Before acupuncture began, provider explained to the patient to communicate at any time if the procedure was causing discomfort past their tolerance level. Patient agreed to advise acupuncturist. The acupuncturist counseled the patient on the risks of acupuncture treatment including pain, infection, bleeding, and no relief of pain. The patient was positioned comfortably. There was no evidence of infection at the site of needle insertions.    Objective   Physical Exam         Treatment Plan  Treatment Goals: Pain management, Wellbeing improvement    Acupuncture Treatment  Patient Position: Lateral recumbent on a table  Acupuncture Needling: Yes  Needle Guage: 36 guage /.20/ Blue seirin  Body Points: With retention  Body Points - Left: GB34,39,41; BL40,57,58,62; ST36  Body Points - Bilateral: BL23,52; LV3  Body Points - Right: KD3,7  Auricular Points: No  Electroacupuncture Used: No  Other Techniques Utilized: Topicals, TDP Lamp  Topicals Description: Po Sum On on low back and left leg  TDP Lamp Descripton: 20mins on low back  Needle Count In: 16  Needle Count Out: 16  Needle Retention Time (min): 30 minutes  Total Face to Face Time (min): 30 minutes              Assessment/Plan

## 2024-06-25 ENCOUNTER — APPOINTMENT (OUTPATIENT)
Dept: PRIMARY CARE | Facility: CLINIC | Age: 64
End: 2024-06-25
Payer: COMMERCIAL

## 2024-06-26 ENCOUNTER — OFFICE VISIT (OUTPATIENT)
Dept: PRIMARY CARE | Facility: CLINIC | Age: 64
End: 2024-06-26
Payer: COMMERCIAL

## 2024-06-26 VITALS
SYSTOLIC BLOOD PRESSURE: 108 MMHG | WEIGHT: 156 LBS | TEMPERATURE: 97.5 F | DIASTOLIC BLOOD PRESSURE: 68 MMHG | HEART RATE: 88 BPM | OXYGEN SATURATION: 93 % | HEIGHT: 63 IN | BODY MASS INDEX: 27.64 KG/M2

## 2024-06-26 DIAGNOSIS — I10 PRIMARY HYPERTENSION: Primary | ICD-10-CM

## 2024-06-26 DIAGNOSIS — Z76.89 ENCOUNTER FOR WEIGHT MANAGEMENT: ICD-10-CM

## 2024-06-26 PROCEDURE — 3008F BODY MASS INDEX DOCD: CPT | Performed by: FAMILY MEDICINE

## 2024-06-26 PROCEDURE — 1036F TOBACCO NON-USER: CPT | Performed by: FAMILY MEDICINE

## 2024-06-26 PROCEDURE — 1159F MED LIST DOCD IN RCRD: CPT | Performed by: FAMILY MEDICINE

## 2024-06-26 PROCEDURE — 1126F AMNT PAIN NOTED NONE PRSNT: CPT | Performed by: FAMILY MEDICINE

## 2024-06-26 PROCEDURE — 99213 OFFICE O/P EST LOW 20 MIN: CPT | Performed by: FAMILY MEDICINE

## 2024-06-26 PROCEDURE — 1158F ADVNC CARE PLAN TLK DOCD: CPT | Performed by: FAMILY MEDICINE

## 2024-06-26 PROCEDURE — 1157F ADVNC CARE PLAN IN RCRD: CPT | Performed by: FAMILY MEDICINE

## 2024-06-26 PROCEDURE — 1160F RVW MEDS BY RX/DR IN RCRD: CPT | Performed by: FAMILY MEDICINE

## 2024-06-26 PROCEDURE — 3078F DIAST BP <80 MM HG: CPT | Performed by: FAMILY MEDICINE

## 2024-06-26 PROCEDURE — 1123F ACP DISCUSS/DSCN MKR DOCD: CPT | Performed by: FAMILY MEDICINE

## 2024-06-26 PROCEDURE — 3074F SYST BP LT 130 MM HG: CPT | Performed by: FAMILY MEDICINE

## 2024-06-26 RX ORDER — HYDROCHLOROTHIAZIDE 25 MG/1
12.5 TABLET ORAL DAILY
Qty: 45 TABLET | Refills: 1 | Status: SHIPPED | OUTPATIENT
Start: 2024-06-26 | End: 2025-06-26

## 2024-06-26 ASSESSMENT — PATIENT HEALTH QUESTIONNAIRE - PHQ9
SUM OF ALL RESPONSES TO PHQ9 QUESTIONS 1 AND 2: 0
2. FEELING DOWN, DEPRESSED OR HOPELESS: NOT AT ALL
1. LITTLE INTEREST OR PLEASURE IN DOING THINGS: NOT AT ALL

## 2024-06-26 ASSESSMENT — LIFESTYLE VARIABLES
AUDIT-C TOTAL SCORE: 4
HOW OFTEN DO YOU HAVE SIX OR MORE DRINKS ON ONE OCCASION: NEVER
SKIP TO QUESTIONS 9-10: 1
HOW OFTEN DO YOU HAVE A DRINK CONTAINING ALCOHOL: 4 OR MORE TIMES A WEEK
HOW MANY STANDARD DRINKS CONTAINING ALCOHOL DO YOU HAVE ON A TYPICAL DAY: 1 OR 2

## 2024-06-26 ASSESSMENT — PAIN SCALES - GENERAL: PAINLEVEL: 0-NO PAIN

## 2024-06-26 NOTE — PROGRESS NOTES
History Of Present Illness  Selam Eubanks is a 64 y.o. female presenting for Follow-up (Medication adjustment.)  .    HTN - controlled with current regimen. Occasional dizziness in the AM when she first gets up but then moves throughout the day without issues. Home BP monitoring is WNL.    Still looking for assistance with weight loss. Struggling with last 10 lbs. Rybelsus caused side effects. Would like to do Ozempic injection. Did not  rx sent 6/9/24.          Past Medical History  Patient Active Problem List    Diagnosis Date Noted    Huizar's esophagus without dysplasia 01/08/2024    Anxiety 01/08/2024    Chronic constipation 01/08/2024    HLD (hyperlipidemia) 01/08/2024    Hypertension 01/08/2024    Lumbar radicular pain 01/08/2024    Primary insomnia 01/08/2024    Rheumatoid arthritis with positive rheumatoid factor (Multi) 01/08/2024    Vertigo 01/08/2024    Subclavian steal syndrome 01/08/2024    Nuclear sclerosis of both eyes 01/08/2024    Osteoarthritis of multiple joints 01/08/2024    Eczema, allergic 01/08/2024    Degeneration of intervertebral disc of lumbosacral region 01/08/2024    Adenoma of colon 01/08/2024    Personal history of other malignant neoplasm of skin 10/24/2018    Nummular dermatitis 10/24/2018        Medications  Current Outpatient Medications on File Prior to Visit   Medication Sig    albuterol (Proventil HFA) 90 mcg/actuation inhaler Inhale 2 puffs every 6 hours if needed for wheezing.    albuterol 2.5 mg /3 mL (0.083 %) nebulizer solution Take 3 mL (2.5 mg) by nebulization 4 times a day as needed for wheezing or shortness of breath.    aspirin 81 mg EC tablet Take 1 tablet (81 mg) by mouth once daily.    fluticasone (Flonase) 50 mcg/actuation nasal spray Administer 1 spray into each nostril once daily.    folic acid (Folvite) 1 mg tablet TAKE TWO TABLETS BY MOUTH once a DAY    hydroxychloroquine (Plaquenil) 200 mg tablet TAKE ONE TABLET BY MOUTH TWO TIMES A DAY    losartan  (Cozaar) 50 mg tablet Take 1 tablet (50 mg) by mouth once daily.    montelukast (Singulair) 10 mg tablet Take 1 tablet (10 mg) by mouth once every 24 hours.    multivitamin tablet Take 1 tablet by mouth once daily.    omeprazole (PriLOSEC) 20 mg DR capsule TAKE ONE CAPSULE BY MOUTH DAILY    potassium chloride CR 10 mEq ER tablet Take 1 tablet (10 mEq) by mouth once daily. with food    rosuvastatin (Crestor) 10 mg tablet Take 1 tablet (10 mg) by mouth once daily.    semaglutide (Ozempic) 0.25 mg or 0.5 mg(2 mg/1.5 mL) pen injector Inject 0.25 mg under the skin 1 (one) time per week.    simethicone (Mylicon,Gas-X) 125 mg capsule Take 1 capsule (125 mg) by mouth every 6 hours if needed for flatulence (or bloating).    [DISCONTINUED] hydroCHLOROthiazide (HYDRODiuril) 25 mg tablet Take 1 tablet (25 mg) by mouth once daily.    [DISCONTINUED] omeprazole (PriLOSEC) 20 mg DR capsule Take 1 capsule (20 mg) by mouth. Do not crush or chew.    [DISCONTINUED] semaglutide (Rybelsus) 7 mg tablet Take 1 tablet (7 mg) by mouth once daily.     No current facility-administered medications on file prior to visit.        Surgical History  She has a past surgical history that includes Other surgical history (04/01/2015); Tubal ligation (04/01/2015); Mouth surgery (03/20/2017); Tonsillectomy (03/20/2017); Other surgical history (03/20/2017); Colonoscopy (03/20/2017); CT angio neck (11/15/2021); Adenoidectomy; Back surgery; and Spine surgery.     Social History  She reports that she has quit smoking. Her smoking use included cigarettes. She has a 22.5 pack-year smoking history. She has quit using smokeless tobacco. She reports current alcohol use of about 3.0 - 6.0 standard drinks of alcohol per week. She reports that she does not use drugs.    Family History  Family History   Problem Relation Name Age of Onset    Heart disease Father Ilir         Allergies  Amoxicillin and Penicillins    Immunizations  Immunization History  "  Administered Date(s) Administered    Flu vaccine (IIV4), preservative free *Check age/dose* 10/06/2017, 10/14/2021, 10/18/2022    Influenza, Unspecified 09/12/2012    Influenza, seasonal, injectable 10/07/2016    Moderna SARS-CoV-2 Vaccination 01/04/2021, 02/01/2021    Pneumococcal conjugate vaccine, 13-valent (PREVNAR 13) 11/08/2017    Pneumococcal polysaccharide vaccine, 23-valent, age 2 years and older (PNEUMOVAX 23) 12/22/2010    Tdap vaccine, age 7 year and older (BOOSTRIX, ADACEL) 11/14/2018    Zoster vaccine, recombinant, adult (SHINGRIX) 11/03/2020        ROS  Negative, except as discussed in HPI     Vitals  /68   Pulse 88   Temp 36.4 °C (97.5 °F)   Ht 1.6 m (5' 3\")   Wt 70.8 kg (156 lb)   SpO2 93%   BMI 27.63 kg/m²      Physical Exam  Vitals and nursing note reviewed.   Constitutional:       General: She is not in acute distress.     Appearance: Normal appearance.   Cardiovascular:      Rate and Rhythm: Normal rate and regular rhythm.      Heart sounds: Normal heart sounds.   Pulmonary:      Effort: No respiratory distress.      Breath sounds: Normal breath sounds.   Neurological:      General: No focal deficit present.      Mental Status: She is alert. Mental status is at baseline.         Relevant Results  Lab Results   Component Value Date    WBC 5.5 11/15/2022    WBC 7.2 11/08/2021    HGB 12.8 11/15/2022    HGB 12.6 11/08/2021    HCT 38.8 11/15/2022    HCT 42.0 11/08/2021    MCV 91 11/15/2022    MCV 92 11/08/2021     11/15/2022     11/08/2021     Lab Results   Component Value Date     01/15/2024     (L) 01/09/2024    K 4.1 01/15/2024    K 3.7 01/09/2024    CL 93 (L) 01/15/2024    CL 89 (L) 01/09/2024    CO2 32 01/15/2024    CO2 27 01/09/2024    BUN 15 01/15/2024    BUN 16 01/09/2024    CREATININE 0.92 01/15/2024    CREATININE 0.91 01/09/2024    CALCIUM 9.7 01/15/2024    CALCIUM 9.5 01/09/2024    PROT 7.5 01/09/2024    PROT 7.5 11/15/2022    BILITOT 0.4 01/09/2024 "    BILITOT 0.4 11/15/2022    ALKPHOS 83 01/09/2024    ALKPHOS 75 11/15/2022    ALT 35 01/09/2024    ALT 27 11/15/2022    AST 55 (H) 01/09/2024    AST 34 11/15/2022    GLUCOSE 107 (H) 01/15/2024    GLUCOSE 70 (L) 01/09/2024     Lab Results   Component Value Date    HGBA1C 5.2 11/15/2022     Lab Results   Component Value Date    TSH 1.73 01/09/2024    FREET4 1.43 11/15/2022      Lab Results   Component Value Date    CHOL 185 01/09/2024    TRIG 70 01/09/2024    .9 01/09/2024           Assessment/Plan   Selam was seen today for follow-up.  Diagnoses and all orders for this visit:  Primary hypertension (Primary)  Comments:  monitor BP  BMI 29.0-29.9,adult  Encounter for weight management  Other orders  -     hydroCHLOROthiazide (HYDRODiuril) 25 mg tablet; Take 0.5 tablets (12.5 mg) by mouth once daily.         Counseling:   Medication education:   -Education:  The patient is counseled regarding potential side-effects of any and all new medications  -Understanding:  Patient expressed understanding of information discussed today  -Adherence:  No barriers to adherence identified    Final treatment plan is a result of shared decision making with patient.         Samuel Colby MD

## 2024-06-26 NOTE — PATIENT INSTRUCTIONS
Start ozempic 0.25mg weekly for 1 week, call for increased dose at end of month. You can be on it for another 3 months and then BMI is under

## 2024-06-27 ENCOUNTER — APPOINTMENT (OUTPATIENT)
Dept: INTEGRATIVE MEDICINE | Facility: CLINIC | Age: 64
End: 2024-06-27
Payer: COMMERCIAL

## 2024-06-27 DIAGNOSIS — M54.59 OTHER LOW BACK PAIN: Primary | ICD-10-CM

## 2024-06-27 PROCEDURE — 97811 ACUP 1/> W/O ESTIM EA ADD 15: CPT | Performed by: INTERNAL MEDICINE

## 2024-06-27 PROCEDURE — 97810 ACUP 1/> WO ESTIM 1ST 15 MIN: CPT | Performed by: INTERNAL MEDICINE

## 2024-06-27 NOTE — PROGRESS NOTES
"Acupuncture Visit:     Subjective   Patient ID: Selam Eubanks is a 64 y.o. female who presents for Back Pain    2024 Acupuncture Benefits                         Goes by \"CUAUHTEMOC\"  02/15/2024 [facility]   [facility] # [id number]   Prior Authorization # [id number]   Approved Dates: 4/15/2024-7/15/2024   Sup Provider : LOUISE CALDWELL : An additional 8 days if patient is showing improvement may be requested)   5/ 12 visits in 90 days     Update: 06/27/24  Tx 7/12    Patient reports she is in a bit more pain today after spending some time baking yesterday.  She states she required a lot of breaks during that task.      Update: 06/24/24  Tx 6/12    5/10 pain    Update: 06/20/24  Tx 5/12    Patient reports her pain has been doing \"pretty good.\"  She states sitting in car for long increases numbness in her low back.  Movement helps.  Notes her right side is starting to cause her discomfort.    Previous Visit with LS: 5/20/24    States she has been able to do more physically with less pain.  L foot no change-numbness.    prev  Notes LBP felt better p tx   5/10 pain level today   Energy level was up a little bit   No change in BM's or sleep      Initial  States she has surgery in 2020 on her low back with no relief  Sx include constant low back pain, L lower leg numbness, can occasionally radiate to L hip.  Numbness of R foot started 2 mos ago  Worse walking > 15-20 min, vacuuming, washing bath tub  Better biofreeze, TENS 2-3x/wk  Pain level 7/10, goes up to 9/10  Denies Rx or OTC pn relievers.  States she is unable to do chiro due to surgery, not advised per chiro.  Sleep is fair  Neuropathy LEFT sided whole foot, inner lower leg, feels tingling numbness, some burning, \"jolts randomly 2x/wk\"   Med hx RA, HTN, eczema    States all organs intact        Session Information  Is this acupuncture treatment being billed to the patient's insurance company: Yes  This is visit number: 7  The patient has a total number of visits of: 12  Initial " Acupuncture Treatment date: 04/15/24  Last Treatment date: 07/15/24  Name of Insurance Company: [employer]  Name of Supervising Physician: Dora Boyd MD PhD  Visit Type: Follow-up visit  Medical History Reviewed: I have reviewed pertinent medical history in EHR, and no contraindications are present to provide treatment  Description of present complaint: Chronic pain  History of Present Illness: Chronic Pain         Review of Systems         Provider reviewed plan for the acupuncture session, precautions and contraindications. Patient/guardian/hospital staff has given consent to treat with full understanding of what to expect during the session. Before acupuncture began, provider explained to the patient to communicate at any time if the procedure was causing discomfort past their tolerance level. Patient agreed to advise acupuncturist. The acupuncturist counseled the patient on the risks of acupuncture treatment including pain, infection, bleeding, and no relief of pain. The patient was positioned comfortably. There was no evidence of infection at the site of needle insertions.    Objective   Physical Exam    Acupuncture Physical Exam  Pain with Palpation/Tightness: Left leg muscles feel less tense during palpation compared to previous visits  Visual Inspection: No fasciculation observed on left leg today    Treatment Plan  Treatment Goals: Pain management, Wellbeing improvement    Acupuncture Treatment  Patient Position: Lateral recumbent on a table  Acupuncture Needling: Yes  Needle Guage: 36 guage /.20/ Blue seirin  Body Points: With retention  Body Points - Left: GB34,39,41; BL40,57,58,62; ST36  Body Points - Bilateral: BL23,52; LV3  Body Points - Right: KD3,7  Auricular Points: No  Electroacupuncture Used: No  Other Techniques Utilized: Topicals, TDP Lamp  Topicals Description: Po Sum On  TDP Lamp Descripton: 20mins on low back  Needle Count In: 16  Needle Count Out: 16  Needle Retention Time  (min): 30 minutes  Total Face to Face Time (min): 30 minutes              Assessment/Plan

## 2024-07-01 ENCOUNTER — APPOINTMENT (OUTPATIENT)
Dept: INTEGRATIVE MEDICINE | Facility: CLINIC | Age: 64
End: 2024-07-01
Payer: COMMERCIAL

## 2024-07-01 DIAGNOSIS — M54.59 OTHER LOW BACK PAIN: Primary | ICD-10-CM

## 2024-07-01 PROCEDURE — 97811 ACUP 1/> W/O ESTIM EA ADD 15: CPT | Performed by: INTERNAL MEDICINE

## 2024-07-01 PROCEDURE — 97810 ACUP 1/> WO ESTIM 1ST 15 MIN: CPT | Performed by: INTERNAL MEDICINE

## 2024-07-01 NOTE — PROGRESS NOTES
"Acupuncture Visit:     Subjective   Patient ID: Selam Eubanks is a 64 y.o. female who presents for Back Pain    2024 Acupuncture Benefits                         Goes by \"CUAUHTEMOC\"  02/15/2024 [facility] # [id number]   Prior Authorization # [id number]   Approved Dates: 4/15/2024-7/15/2024   Sup Provider : LOUISE CALDWELL : An additional 8 days if patient is showing improvement may be requested)   5/ 12 visits in 90 days     Update: 07/01/24  Tx 8/12    Patient reports she has been in a little more pain this past week.      Update: 06/27/24  Tx 7/12    Patient reports she is in a bit more pain today after spending some time baking yesterday.  She states she required a lot of breaks during that task.      Update: 06/24/24  Tx 6/12    5/10 pain    Update: 06/20/24  Tx 5/12    Patient reports her pain has been doing \"pretty good.\"  She states sitting in car for long increases numbness in her low back.  Movement helps.  Notes her right side is starting to cause her discomfort.    Previous Visit with LS: 5/20/24    States she has been able to do more physically with less pain.  L foot no change-numbness.    prev  Notes LBP felt better p tx   5/10 pain level today   Energy level was up a little bit   No change in BM's or sleep      Initial  States she has surgery in 2020 on her low back with no relief  Sx include constant low back pain, L lower leg numbness, can occasionally radiate to L hip.  Numbness of R foot started 2 mos ago  Worse walking > 15-20 min, vacuuming, washing bath tub  Better biofreeze, TENS 2-3x/wk  Pain level 7/10, goes up to 9/10  Denies Rx or OTC pn relievers.  States she is unable to do chiro due to surgery, not advised per chiro.  Sleep is fair  Neuropathy LEFT sided whole foot, inner lower leg, feels tingling numbness, some burning, \"jolts randomly 2x/wk\"   Med hx RA, HTN, eczema    States all organs intact        Session Information  Is this acupuncture treatment being billed to the patient's " insurance company: Yes  This is visit number: 8  The patient has a total number of visits of: 30  Initial Acupuncture Treatment date: 04/15/24  Last Treatment date: 07/15/24  Name of Insurance Company: [employer]  Name of Supervising Physician: Dora Boyd MD PhD  Visit Type: Follow-up visit  Medical History Reviewed: I have reviewed pertinent medical history in EHR, and no contraindications are present to provide treatment  Description of present complaint: Chronic pain, Discomfort  History of Present Illness: Chronic Pain         Review of Systems         Provider reviewed plan for the acupuncture session, precautions and contraindications. Patient/guardian/hospital staff has given consent to treat with full understanding of what to expect during the session. Before acupuncture began, provider explained to the patient to communicate at any time if the procedure was causing discomfort past their tolerance level. Patient agreed to advise acupuncturist. The acupuncturist counseled the patient on the risks of acupuncture treatment including pain, infection, bleeding, and no relief of pain. The patient was positioned comfortably. There was no evidence of infection at the site of needle insertions.    Objective   Physical Exam    Acupuncture Physical Exam  Visual Inspection: Fasciculations observed on left leg    Treatment Plan  Treatment Goals: Pain management, Wellbeing improvement    Acupuncture Treatment  Patient Position: Lateral recumbent on a table  Acupuncture Needling: Yes  Needle Guage: 36 guage /.20/ Blue seirin  Body Points: With retention  Body Points - Left: BL52,57,58,62; GB34,39,40,41; Vanessa on left leg x2  Body Points - Bilateral: BL23; LV3  Auricular Points: No  Electroacupuncture Used: No  Other Techniques Utilized: TDP Lamp  TDP Lamp Descripton: 20mins on low back  Needle Count In: 14  Needle Count Out: 14  Needle Retention Time (min): 30 minutes  Total Face to Face Time (min): 30  minutes              Assessment/Plan

## 2024-07-03 ENCOUNTER — TELEPHONE (OUTPATIENT)
Dept: PRIMARY CARE | Facility: CLINIC | Age: 64
End: 2024-07-03
Payer: COMMERCIAL

## 2024-07-03 NOTE — TELEPHONE ENCOUNTER
PT SENT REQUEST FOR PRIOR AUTH ON MEDICATION AND HAS NOT GOTTEN AN ANSWER IT HAS BEEN A FEW DAYS OZEMPIC UNSURE OF DOSAGE SENT TO [facility] [address]

## 2024-07-08 ENCOUNTER — APPOINTMENT (OUTPATIENT)
Dept: INTEGRATIVE MEDICINE | Facility: CLINIC | Age: 64
End: 2024-07-08
Payer: COMMERCIAL

## 2024-07-08 NOTE — TELEPHONE ENCOUNTER
Patient calling again to follow up on this. States she  has called multiple times over the past 2 weeks

## 2024-07-09 ENCOUNTER — PATIENT MESSAGE (OUTPATIENT)
Dept: PRIMARY CARE | Facility: CLINIC | Age: 64
End: 2024-07-09
Payer: COMMERCIAL

## 2024-07-09 ENCOUNTER — APPOINTMENT (OUTPATIENT)
Dept: CARDIOLOGY | Facility: CLINIC | Age: 64
End: 2024-07-09
Payer: COMMERCIAL

## 2024-07-09 DIAGNOSIS — Z76.89 ENCOUNTER FOR WEIGHT MANAGEMENT: Primary | ICD-10-CM

## 2024-07-11 ENCOUNTER — APPOINTMENT (OUTPATIENT)
Dept: INTEGRATIVE MEDICINE | Facility: CLINIC | Age: 64
End: 2024-07-11
Payer: COMMERCIAL

## 2024-07-11 DIAGNOSIS — M54.59 OTHER LOW BACK PAIN: Primary | ICD-10-CM

## 2024-07-11 PROCEDURE — 97811 ACUP 1/> W/O ESTIM EA ADD 15: CPT | Performed by: INTERNAL MEDICINE

## 2024-07-11 PROCEDURE — 97810 ACUP 1/> WO ESTIM 1ST 15 MIN: CPT | Performed by: INTERNAL MEDICINE

## 2024-07-11 NOTE — PROGRESS NOTES
"Acupuncture Visit:     Subjective   Patient ID: Selam Eubanks is a 64 y.o. female who presents for No chief complaint on file.    2024 Acupuncture Benefits                         Goes by \"CUAUHTEMOC\"  02/15/2024 [facility]   [facility] # [id number]   Prior Authorization # [id number]   Approved Dates: 4/15/2024-7/15/2024   Sup Provider : LOUISE CALDWELL : An additional 8 days if patient is showing improvement may be requested)   5/ 12 visits in 90 days     Update: 07/01/24  Tx 8/12    States baseline releif, pain still goes up to a 8-9/10 with baking, cooking, cleaning, etc. Daily activities are still very impacted by her pain.      Update: 06/27/24  Tx 7/12    Patient reports she is in a bit more pain today after spending some time baking yesterday.  She states she required a lot of breaks during that task.      Update: 06/24/24  Tx 6/12    5/10 pain    Update: 06/20/24  Tx 5/12    Patient reports her pain has been doing \"pretty good.\"  She states sitting in car for long increases numbness in her low back.  Movement helps.  Notes her right side is starting to cause her discomfort.    Previous Visit with LS: 5/20/24    States she has been able to do more physically with less pain.  L foot no change-numbness.    prev  Notes LBP felt better p tx   5/10 pain level today   Energy level was up a little bit   No change in BM's or sleep      Initial  States she has surgery in 2020 on her low back with no relief  Sx include constant low back pain, L lower leg numbness, can occasionally radiate to L hip.  Numbness of R foot started 2 mos ago  Worse walking > 15-20 min, vacuuming, washing bath tub  Better biofreeze, TENS 2-3x/wk  Pain level 7/10, goes up to 9/10  Denies Rx or OTC pn relievers.  States she is unable to do chiro due to surgery, not advised per chiro.  Sleep is fair  Neuropathy LEFT sided whole foot, inner lower leg, feels tingling numbness, some burning, \"jolts randomly 2x/wk\"   Med hx RA, HTN, eczema    States all organs " intact        Session Information  Is this acupuncture treatment being billed to the patient's insurance company: Yes  This is visit number: 9  The patient has a total number of visits of: 30  Initial Acupuncture Treatment date: 04/15/24  Last Treatment date: 07/15/24  Name of Insurance Company: [employer]  Name of Supervising Physician: Dora Boyd MD PhD  Visit Type: Follow-up visit  Since Last Visit, Patient Noted Improved: Chronic Pain         Review of Systems         Provider reviewed plan for the acupuncture session, precautions and contraindications. Patient/guardian/hospital staff has given consent to treat with full understanding of what to expect during the session. Before acupuncture began, provider explained to the patient to communicate at any time if the procedure was causing discomfort past their tolerance level. Patient agreed to advise acupuncturist. The acupuncturist counseled the patient on the risks of acupuncture treatment including pain, infection, bleeding, and no relief of pain. The patient was positioned comfortably. There was no evidence of infection at the site of needle insertions.    Objective   Physical Exam              Acupuncture Treatment  Patient Position: Supine on a table  Acupuncture Needling: Yes  Body Points: With retention  Body Points - Left: LI4 - 88.12 88.13 88.14 - SP9 1.5 SP6 4  Body Points - Bilateral: CV12 CV6 ST25 LR14  Body Points - Right: LR3  TDP Lamp Descripton: moxa (light - RA) on MT 88.12 PTS  Needle Count In: 15  Needle Count Out: 15  Needle Retention Time (min): 35 minutes  Total Face to Face Time (min): 25 minutes              Assessment/Plan

## 2024-07-12 ENCOUNTER — APPOINTMENT (OUTPATIENT)
Dept: RADIOLOGY | Facility: CLINIC | Age: 64
End: 2024-07-12
Payer: COMMERCIAL

## 2024-07-12 ENCOUNTER — LAB (OUTPATIENT)
Dept: LAB | Facility: LAB | Age: 64
End: 2024-07-12
Payer: COMMERCIAL

## 2024-07-12 DIAGNOSIS — M06.9 RHEUMATOID ARTHRITIS, INVOLVING UNSPECIFIED SITE, UNSPECIFIED WHETHER RHEUMATOID FACTOR PRESENT (MULTI): ICD-10-CM

## 2024-07-12 LAB
BASOPHILS # BLD AUTO: 0.03 X10*3/UL (ref 0–0.1)
BASOPHILS NFR BLD AUTO: 0.5 %
EOSINOPHIL # BLD AUTO: 0 X10*3/UL (ref 0–0.7)
EOSINOPHIL NFR BLD AUTO: 0 %
ERYTHROCYTE [DISTWIDTH] IN BLOOD BY AUTOMATED COUNT: 13.2 % (ref 11.5–14.5)
HCT VFR BLD AUTO: 33.1 % (ref 36–46)
HGB BLD-MCNC: 10.9 G/DL (ref 12–16)
IMM GRANULOCYTES # BLD AUTO: 0.07 X10*3/UL (ref 0–0.7)
IMM GRANULOCYTES NFR BLD AUTO: 1.1 % (ref 0–0.9)
LYMPHOCYTES # BLD AUTO: 1.45 X10*3/UL (ref 1.2–4.8)
LYMPHOCYTES NFR BLD AUTO: 23.7 %
MCH RBC QN AUTO: 31.4 PG (ref 26–34)
MCHC RBC AUTO-ENTMCNC: 32.9 G/DL (ref 32–36)
MCV RBC AUTO: 95 FL (ref 80–100)
MONOCYTES # BLD AUTO: 0.67 X10*3/UL (ref 0.1–1)
MONOCYTES NFR BLD AUTO: 10.9 %
NEUTROPHILS # BLD AUTO: 3.91 X10*3/UL (ref 1.2–7.7)
NEUTROPHILS NFR BLD AUTO: 63.8 %
NRBC BLD-RTO: 0 /100 WBCS (ref 0–0)
PLATELET # BLD AUTO: 218 X10*3/UL (ref 150–450)
RBC # BLD AUTO: 3.47 X10*6/UL (ref 4–5.2)
WBC # BLD AUTO: 6.1 X10*3/UL (ref 4.4–11.3)

## 2024-07-12 PROCEDURE — 85025 COMPLETE CBC W/AUTO DIFF WBC: CPT

## 2024-07-12 PROCEDURE — 36415 COLL VENOUS BLD VENIPUNCTURE: CPT

## 2024-07-15 DIAGNOSIS — R79.9 ABNORMAL BLOOD CHEMISTRY: ICD-10-CM

## 2024-07-16 ENCOUNTER — HOSPITAL ENCOUNTER (OUTPATIENT)
Dept: RADIOLOGY | Facility: CLINIC | Age: 64
Discharge: HOME | End: 2024-07-16
Payer: COMMERCIAL

## 2024-07-16 DIAGNOSIS — M85.89 OTHER SPECIFIED DISORDERS OF BONE DENSITY AND STRUCTURE, MULTIPLE SITES: ICD-10-CM

## 2024-07-16 PROCEDURE — 77080 DXA BONE DENSITY AXIAL: CPT

## 2024-07-16 ASSESSMENT — LIFESTYLE VARIABLES
3_OR_MORE_DRINKS_PER_DAY: Y
CURRENT_SMOKER: N

## 2024-07-17 ENCOUNTER — TELEPHONE (OUTPATIENT)
Dept: GASTROENTEROLOGY | Facility: CLINIC | Age: 64
End: 2024-07-17
Payer: COMMERCIAL

## 2024-07-17 NOTE — TELEPHONE ENCOUNTER
Patient had called reporting her upper abdominal tightness had continued.  She had noted that when she took MiraLAX more recently daily for 3 days she felt better.  I suggested she could increase her MiraLAX to daily and see how she does over the next 3 to 4 weeks.  She is agreement with the plan.

## 2024-07-19 ENCOUNTER — LAB (OUTPATIENT)
Dept: LAB | Facility: LAB | Age: 64
End: 2024-07-19
Payer: COMMERCIAL

## 2024-07-19 DIAGNOSIS — R79.9 ABNORMAL BLOOD CHEMISTRY: ICD-10-CM

## 2024-07-23 ENCOUNTER — LAB (OUTPATIENT)
Dept: LAB | Facility: LAB | Age: 64
End: 2024-07-23
Payer: COMMERCIAL

## 2024-07-23 DIAGNOSIS — R79.9 ABNORMAL BLOOD CHEMISTRY: ICD-10-CM

## 2024-07-23 PROCEDURE — 85025 COMPLETE CBC W/AUTO DIFF WBC: CPT

## 2024-07-23 PROCEDURE — 36415 COLL VENOUS BLD VENIPUNCTURE: CPT

## 2024-07-24 LAB
BASOPHILS # BLD AUTO: 0.04 X10*3/UL (ref 0–0.1)
BASOPHILS NFR BLD AUTO: 0.6 %
EOSINOPHIL # BLD AUTO: 0 X10*3/UL (ref 0–0.7)
EOSINOPHIL NFR BLD AUTO: 0 %
ERYTHROCYTE [DISTWIDTH] IN BLOOD BY AUTOMATED COUNT: 13.3 % (ref 11.5–14.5)
HCT VFR BLD AUTO: 34 % (ref 36–46)
HGB BLD-MCNC: 11.4 G/DL (ref 12–16)
IMM GRANULOCYTES # BLD AUTO: 0.04 X10*3/UL (ref 0–0.7)
IMM GRANULOCYTES NFR BLD AUTO: 0.6 % (ref 0–0.9)
LYMPHOCYTES # BLD AUTO: 1.13 X10*3/UL (ref 1.2–4.8)
LYMPHOCYTES NFR BLD AUTO: 16.9 %
MCH RBC QN AUTO: 31.1 PG (ref 26–34)
MCHC RBC AUTO-ENTMCNC: 33.5 G/DL (ref 32–36)
MCV RBC AUTO: 93 FL (ref 80–100)
MONOCYTES # BLD AUTO: 0.55 X10*3/UL (ref 0.1–1)
MONOCYTES NFR BLD AUTO: 8.2 %
NEUTROPHILS # BLD AUTO: 4.94 X10*3/UL (ref 1.2–7.7)
NEUTROPHILS NFR BLD AUTO: 73.7 %
NRBC BLD-RTO: 0 /100 WBCS (ref 0–0)
PLATELET # BLD AUTO: 242 X10*3/UL (ref 150–450)
RBC # BLD AUTO: 3.67 X10*6/UL (ref 4–5.2)
WBC # BLD AUTO: 6.7 X10*3/UL (ref 4.4–11.3)

## 2024-07-29 ENCOUNTER — TELEPHONE (OUTPATIENT)
Dept: PRIMARY CARE | Facility: CLINIC | Age: 64
End: 2024-07-29
Payer: COMMERCIAL

## 2024-07-29 NOTE — TELEPHONE ENCOUNTER
PT SENT MSG ON 7/24 REGARDING BLOOD RESULTS THAT RHUMATOLOGIST ORDERED PT ASKING IF DR CAN DISCUSS THESE RESULTS AND TREAT HER.  PT DECLINED MAKING APPOINTMENT WITH  FOR THIS SHE INSISTED ON LEAVING A MESSAGE

## 2024-07-30 ENCOUNTER — APPOINTMENT (OUTPATIENT)
Dept: CARDIOLOGY | Facility: CLINIC | Age: 64
End: 2024-07-30
Payer: COMMERCIAL

## 2024-08-02 ENCOUNTER — TELEPHONE (OUTPATIENT)
Dept: PRIMARY CARE | Facility: CLINIC | Age: 64
End: 2024-08-02
Payer: COMMERCIAL

## 2024-08-02 NOTE — TELEPHONE ENCOUNTER
Lm on pt vm notifying pt that pcp will be out of the office until next Thursday 8/8/2024 but I will forward this message to pt pcp and once pcp responds we will give the pt an call back.

## 2024-08-02 NOTE — TELEPHONE ENCOUNTER
PT CALLING REGARDING LAB RESULTS SHE IS ANEMIC AND IS REQUESTING TREATMENT FOR THIS PT STATES THIS HAS BEEN GOING ON FOR 2 WEEKS WOULD LIKE TO TALK TO SOMEONE ABOUT THIS

## 2024-08-13 ENCOUNTER — OFFICE VISIT (OUTPATIENT)
Dept: CARDIOLOGY | Facility: CLINIC | Age: 64
End: 2024-08-13
Payer: COMMERCIAL

## 2024-08-13 VITALS
OXYGEN SATURATION: 95 % | DIASTOLIC BLOOD PRESSURE: 83 MMHG | WEIGHT: 159.5 LBS | HEART RATE: 89 BPM | BODY MASS INDEX: 28.25 KG/M2 | SYSTOLIC BLOOD PRESSURE: 172 MMHG

## 2024-08-13 DIAGNOSIS — I10 PRIMARY HYPERTENSION: Primary | ICD-10-CM

## 2024-08-13 LAB
ATRIAL RATE: 90 BPM
P AXIS: 77 DEGREES
P OFFSET: 187 MS
P ONSET: 133 MS
PR INTERVAL: 178 MS
Q ONSET: 222 MS
QRS COUNT: 15 BEATS
QRS DURATION: 86 MS
QT INTERVAL: 366 MS
QTC CALCULATION(BAZETT): 447 MS
QTC FREDERICIA: 419 MS
R AXIS: 68 DEGREES
T AXIS: 59 DEGREES
T OFFSET: 405 MS
VENTRICULAR RATE: 90 BPM

## 2024-08-13 PROCEDURE — 3077F SYST BP >= 140 MM HG: CPT | Performed by: NURSE PRACTITIONER

## 2024-08-13 PROCEDURE — 99213 OFFICE O/P EST LOW 20 MIN: CPT | Performed by: NURSE PRACTITIONER

## 2024-08-13 PROCEDURE — 1159F MED LIST DOCD IN RCRD: CPT | Performed by: NURSE PRACTITIONER

## 2024-08-13 PROCEDURE — 3079F DIAST BP 80-89 MM HG: CPT | Performed by: NURSE PRACTITIONER

## 2024-08-13 PROCEDURE — 1157F ADVNC CARE PLAN IN RCRD: CPT | Performed by: NURSE PRACTITIONER

## 2024-08-13 PROCEDURE — 1036F TOBACCO NON-USER: CPT | Performed by: NURSE PRACTITIONER

## 2024-08-13 PROCEDURE — 93005 ELECTROCARDIOGRAM TRACING: CPT | Performed by: NURSE PRACTITIONER

## 2024-08-13 PROCEDURE — 1123F ACP DISCUSS/DSCN MKR DOCD: CPT | Performed by: NURSE PRACTITIONER

## 2024-08-13 ASSESSMENT — ENCOUNTER SYMPTOMS
GASTROINTESTINAL NEGATIVE: 1
MUSCULOSKELETAL NEGATIVE: 1
HEMATOLOGIC/LYMPHATIC NEGATIVE: 1
CARDIOVASCULAR NEGATIVE: 1
RESPIRATORY NEGATIVE: 1
EYES NEGATIVE: 1
NEUROLOGICAL NEGATIVE: 1
CONSTITUTIONAL NEGATIVE: 1
PSYCHIATRIC NEGATIVE: 1
ENDOCRINE NEGATIVE: 1

## 2024-08-13 NOTE — PATIENT INSTRUCTIONS
CALL WITH ANY QUESTIONS   INCREASE THE hydrochlorothiazide TO 25 MG DAILY   FOLLOW UP IN ONE YEAR   MONITOR BLOOD PRESSURE AT HOME

## 2024-08-13 NOTE — PROGRESS NOTES
Referred by Dr. Gaytan for Follow-up (1 yr.)     History Of Present Illness:    Selam Eubanks is a very pleasant 64 year old female with a history of  HLD and CAC score 144, she is here for a follow up visit. The patient is seen in collaboration with Dr. Maciel. Denies chest pain, shortness of breath or heart palpitations. Continues to stay active. Having GI issues     Review of Systems   Constitutional: Negative.   HENT: Negative.     Eyes: Negative.    Cardiovascular: Negative.    Respiratory: Negative.     Endocrine: Negative.    Hematologic/Lymphatic: Negative.    Skin: Negative.    Musculoskeletal: Negative.    Gastrointestinal: Negative.    Neurological: Negative.    Psychiatric/Behavioral: Negative.        Past Medical History:  She has a past medical history of Abnormal finding of blood chemistry, unspecified (02/19/2018), Abnormal levels of other serum enzymes (12/30/2015), Acute bronchitis, unspecified (02/04/2019), Acute bronchitis, unspecified (02/02/2018), Acute maxillary sinusitis, unspecified (09/16/2019), Acute maxillary sinusitis, unspecified (02/04/2019), Acute upper respiratory infection, unspecified (03/12/2020), Allergic, Anxiety, Arthritis, Asthma ([facility]), Huizar's esophagus without dysplasia (07/20/2017), Huizar's esophagus without dysplasia (03/03/2016), Huizar's esophagus without dysplasia (07/25/2016), Chronic maxillary sinusitis (12/05/2019), Encounter for full-term uncomplicated delivery ([facility]), GERD (gastroesophageal reflux disease), Hypertension, Other conditions influencing health status, Other general symptoms and signs (03/12/2020), Other long term (current) drug therapy (10/21/2020), Other specified anxiety disorders (07/25/2016), Personal history of other diseases of the digestive system (03/29/2016), Personal history of other diseases of the digestive system (11/30/2015), Personal history of other diseases of the digestive system (09/22/2020), Personal history of  other diseases of the musculoskeletal system and connective tissue (08/25/2022), Personal history of other diseases of the nervous system and sense organs, Personal history of other diseases of the nervous system and sense organs (05/14/2014), Personal history of other diseases of the respiratory system (09/20/2017), Personal history of other diseases of the respiratory system (09/16/2019), Personal history of other diseases of the respiratory system (03/12/2020), Personal history of other diseases of the respiratory system (10/21/2018), Personal history of other specified conditions (02/02/2018), Pleurodynia (02/02/2018), Radiculopathy, lumbosacral region (07/30/2020), Spinal stenosis, lumbar region without neurogenic claudication (06/05/2020), Trigger finger, right ring finger (08/20/2019), Trigger finger, unspecified finger (05/15/2013), and Vitamin D deficiency, unspecified (07/25/2016).    Past Surgical History:  She has a past surgical history that includes Other surgical history (04/01/2015); Tubal ligation (04/01/2015); Mouth surgery (03/20/2017); Tonsillectomy (03/20/2017); Other surgical history (03/20/2017); Colonoscopy (03/20/2017); CT angio neck (11/15/2021); Adenoidectomy; Back surgery; and Spine surgery.      Social History:  She reports that she has quit smoking. Her smoking use included cigarettes. She has a 22.5 pack-year smoking history. She has quit using smokeless tobacco. She reports current alcohol use of about 3.0 - 6.0 standard drinks of alcohol per week. She reports that she does not use drugs.    Family History:  Family History   Problem Relation Name Age of Onset    Heart disease Father Ilir         Allergies:  Amoxicillin and Penicillins    Outpatient Medications:  Current Outpatient Medications   Medication Instructions    albuterol (Proventil HFA) 90 mcg/actuation inhaler 2 puffs, inhalation, Every 6 hours PRN    albuterol 2.5 mg, nebulization, 4 times daily PRN    aspirin 81 mg,  oral, Daily    fluticasone (Flonase) 50 mcg/actuation nasal spray 1 spray, Each Nostril, Daily    folic acid (FOLVITE) 2 mg, oral, Daily    hydroCHLOROthiazide (HYDRODIURIL) 12.5 mg, oral, Daily    hydroxychloroquine (Plaquenil) 200 mg tablet oral, 2 times daily    losartan (COZAAR) 50 mg, oral, Daily    montelukast (SINGULAIR) 10 mg, oral, Every 24 hours    multivitamin tablet 1 tablet, oral, Daily    omeprazole (PRILOSEC) 20 mg, oral, Daily    potassium chloride CR 10 mEq ER tablet 10 mEq, oral, Daily, with food    rosuvastatin (CRESTOR) 10 mg, oral, Daily    semaglutide (RYBELSUS) 7 mg, oral, Daily    simethicone (MYLICON,GAS-X) 125 mg, oral, Every 6 hours PRN        Last Recorded Vitals:  Vitals:    08/13/24 1533   BP: 172/83   Pulse: 89   SpO2: 95%   Weight: 72.3 kg (159 lb 8 oz)       Physical Exam:  Physical Exam  Vitals reviewed.   HENT:      Head: Normocephalic.      Nose: Nose normal.   Eyes:      Pupils: Pupils are equal, round, and reactive to light.   Cardiovascular:      Rate and Rhythm: Normal rate and regular rhythm.   Pulmonary:      Effort: Pulmonary effort is normal.      Breath sounds: Normal breath sounds.   Abdominal:      General: Abdomen is flat.      Palpations: Abdomen is soft.   Musculoskeletal:         General: Normal range of motion.      Cervical back: Normal range of motion.   Skin:     General: Skin is warm and dry.   Neurological:      General: No focal deficit present.      Mental Status: He is alert and oriented to person, place, and time.   Psychiatric:         Mood and Affect: Mood normal.            Last Labs:  CBC -  Lab Results   Component Value Date    WBC 6.7 07/23/2024    HGB 11.4 (L) 07/23/2024    HCT 34.0 (L) 07/23/2024    MCV 93 07/23/2024     07/23/2024       CMP -  Lab Results   Component Value Date    CALCIUM 9.7 01/15/2024    PROT 7.5 01/09/2024    ALBUMIN 4.1 01/09/2024    AST 55 (H) 01/09/2024    ALT 35 01/09/2024    ALKPHOS 83 01/09/2024    BILITOT 0.4  01/09/2024       LIPID PANEL -   Lab Results   Component Value Date    CHOL 185 01/09/2024    TRIG 70 01/09/2024    .9 01/09/2024    CHHDL 1.6 01/09/2024    LDLF 78 11/15/2022    VLDL 14 01/09/2024    NHDL 71 01/09/2024       RENAL FUNCTION PANEL -   Lab Results   Component Value Date    GLUCOSE 107 (H) 01/15/2024     01/15/2024    K 4.1 01/15/2024    CL 93 (L) 01/15/2024    CO2 32 01/15/2024    ANIONGAP 15 01/15/2024    BUN 15 01/15/2024    CREATININE 0.92 01/15/2024    CALCIUM 9.7 01/15/2024    ALBUMIN 4.1 01/09/2024        Lab Results   Component Value Date    HGBA1C 5.2 11/15/2022       Last Cardiology Tests:  ECG:  EKG independently reviewed from today showed sinus rhythm heart rate 90 bpm   Echo:    Ejection Fractions:    Cath:    Stress Test:    Cardiac Imaging:  CT HEART CALCIUM SCORING WO IV CONTRAST 11/08/2021  LM            0  LAD           144.78  LCx           0  RCA           0     Total          144.78      Assessment/Plan   Very pleasant 64 year-old female with dyslipidemia with CAC score of 144, swelling has improved. Blood pressure had been low, the hydrochlorothiazide was decreased to 12.5 mg daily. Blood pressure today is elevated. The hydrochlorothiazide will be increased to 25 mg daily.       Plan:  Increase the hydrochlorothiazide will be increased to 25 mg daily   Continue to ASA 81 mg daily   Monitor blood pressure at home   Continue Losartan 50 mg daily   Follow up in one year         Rosalva Lopez, APRN-CNP

## 2024-08-15 DIAGNOSIS — M06.9 RHEUMATOID ARTHRITIS, INVOLVING UNSPECIFIED SITE, UNSPECIFIED WHETHER RHEUMATOID FACTOR PRESENT (MULTI): ICD-10-CM

## 2024-08-16 RX ORDER — HYDROXYCHLOROQUINE SULFATE 200 MG/1
TABLET, FILM COATED ORAL 2 TIMES DAILY
Qty: 180 TABLET | Refills: 1 | Status: SHIPPED | OUTPATIENT
Start: 2024-08-16

## 2024-08-19 DIAGNOSIS — I10 PRIMARY HYPERTENSION: ICD-10-CM

## 2024-08-19 NOTE — TELEPHONE ENCOUNTER
RECEIVED FAX FORM [facility] REQUESTING REFILL REQUEST FOR POPULATED MEDICATION, PT HAD A MED FOLLOW UP ON 6/26/24.

## 2024-08-20 RX ORDER — LOSARTAN POTASSIUM 50 MG/1
50 TABLET ORAL DAILY
Qty: 90 TABLET | Refills: 1 | Status: SHIPPED | OUTPATIENT
Start: 2024-08-20

## 2024-08-28 ENCOUNTER — APPOINTMENT (OUTPATIENT)
Dept: GASTROENTEROLOGY | Facility: CLINIC | Age: 64
End: 2024-08-28
Payer: COMMERCIAL

## 2024-08-28 VITALS — HEART RATE: 86 BPM | BODY MASS INDEX: 28.17 KG/M2 | WEIGHT: 159 LBS | HEIGHT: 63 IN

## 2024-08-28 DIAGNOSIS — K59.09 CHRONIC CONSTIPATION: ICD-10-CM

## 2024-08-28 DIAGNOSIS — K30 FUNCTIONAL DYSPEPSIA: Primary | ICD-10-CM

## 2024-08-28 DIAGNOSIS — R14.0 BLOATING: ICD-10-CM

## 2024-08-28 PROCEDURE — 1160F RVW MEDS BY RX/DR IN RCRD: CPT | Performed by: INTERNAL MEDICINE

## 2024-08-28 PROCEDURE — 1159F MED LIST DOCD IN RCRD: CPT | Performed by: INTERNAL MEDICINE

## 2024-08-28 PROCEDURE — 1123F ACP DISCUSS/DSCN MKR DOCD: CPT | Performed by: INTERNAL MEDICINE

## 2024-08-28 PROCEDURE — 3008F BODY MASS INDEX DOCD: CPT | Performed by: INTERNAL MEDICINE

## 2024-08-28 PROCEDURE — 99214 OFFICE O/P EST MOD 30 MIN: CPT | Performed by: INTERNAL MEDICINE

## 2024-08-28 PROCEDURE — 1157F ADVNC CARE PLAN IN RCRD: CPT | Performed by: INTERNAL MEDICINE

## 2024-08-28 RX ORDER — DESIPRAMINE HYDROCHLORIDE 10 MG/1
10 TABLET ORAL NIGHTLY
Qty: 30 TABLET | Refills: 2 | Status: SHIPPED | OUTPATIENT
Start: 2024-08-28 | End: 2024-11-26

## 2024-08-28 NOTE — PROGRESS NOTES
REASON FOR VISIT: Discuss abdominal bloating    HPI:  Selam Eubanks is a 64 y.o. female with history of chronic constipation.  Has had ongoing bloating.  Increased her MiraLAX to every other day moving her bowels regularly but complaining of some ongoing persistent bloating and functional dyspepsia.  No dysphagia or dyne aphasia.  History of GERD for which she takes PPI daily.  Prior history of nondysplastic Huizar's esophagus as well.  Trial of low FODMAP diet and avoiding milk has not helped her symptoms.          PRIOR ENDOSCOPY    PAST MEDICAL HISTORY  Past Medical History:   Diagnosis Date    Abnormal finding of blood chemistry, unspecified 2018    Abnormal blood chemistry    Abnormal levels of other serum enzymes 2015    Abnormal AST and ALT    Acute bronchitis, unspecified 2019    Acute exacerbation of chronic bronchitis    Acute bronchitis, unspecified 2018    Acute bronchitis due to infection    Acute maxillary sinusitis, unspecified 2019    Acute maxillary sinusitis    Acute maxillary sinusitis, unspecified 2019    Acute maxillary sinusitis    Acute upper respiratory infection, unspecified 2020    Viral URI with cough    Allergic     Anxiety     Arthritis     Asthma ([facility])     Huizar's esophagus without dysplasia 2017    Huizar's esophagus without dysplasia    Huizar's esophagus without dysplasia 2016    Huizar's esophagus without dysplasia    Huizar's esophagus without dysplasia 2016    Huizar's esophagus    Chronic maxillary sinusitis 2019    Maxillary sinusitis    Encounter for full-term uncomplicated delivery ([facility])      (spontaneous vaginal delivery)    GERD (gastroesophageal reflux disease)     Hypertension     Other conditions influencing health status     Complete Colonoscopy    Other general symptoms and signs 2020    Influenza-like symptoms    Other long term (current) drug therapy 10/21/2020    Chronic use  of benzodiazepine for therapeutic purpose    Other specified anxiety disorders 07/25/2016    Depression with anxiety    Personal history of other diseases of the digestive system 03/29/2016    History of esophageal reflux    Personal history of other diseases of the digestive system 11/30/2015    History of hiatal hernia    Personal history of other diseases of the digestive system 09/22/2020    History of gastroesophageal reflux (GERD)    Personal history of other diseases of the musculoskeletal system and connective tissue 08/25/2022    History of rheumatoid arthritis    Personal history of other diseases of the nervous system and sense organs     History of blepharitis    Personal history of other diseases of the nervous system and sense organs 05/14/2014    History of carpal tunnel syndrome    Personal history of other diseases of the respiratory system 09/20/2017    History of sinusitis    Personal history of other diseases of the respiratory system 09/16/2019    History of acute bronchitis with bronchospasm    Personal history of other diseases of the respiratory system 03/12/2020    History of influenza    Personal history of other diseases of the respiratory system 10/21/2018    History of bronchitis    Personal history of other specified conditions 02/02/2018    History of persistent cough    Pleurodynia 02/02/2018    Rib pain on left side    Radiculopathy, lumbosacral region 07/30/2020    Lumbosacral radiculitis    Spinal stenosis, lumbar region without neurogenic claudication 06/05/2020    Stenosis of lateral recess of lumbar spine    Trigger finger, right ring finger 08/20/2019    Trigger ring finger of right hand    Trigger finger, unspecified finger 05/15/2013    Trigger finger, acquired    Vitamin D deficiency, unspecified 07/25/2016    Vitamin D deficiency       PAST SURGICAL HISTORY  Past Surgical History:   Procedure Laterality Date    ADENOIDECTOMY      BACK SURGERY      COLONOSCOPY  03/20/2017     Colonoscopy (Fiberoptic)    CT ANGIO NECK  11/15/2021    CT NECK ANGIO W AND WO IV CONTRAST 11/15/2021 [facility]    MOUTH SURGERY  03/20/2017    Oral Surgery Tooth Extraction    OTHER SURGICAL HISTORY  04/01/2015    Drainage Of Ovarian Cyst(S)    OTHER SURGICAL HISTORY  03/20/2017    Endoscopy - Papillotomy    SPINE SURGERY      TONSILLECTOMY  03/20/2017    Tonsillectomy With Adenoidectomy    TUBAL LIGATION  04/01/2015    Tubal Ligation       FAMILY HISTORY  Family History   Problem Relation Name Age of Onset    Heart disease Father Ilir        SOCIAL HISTORY  Social History     Tobacco Use    Smoking status: Former     Current packs/day: 1.50     Average packs/day: 1.5 packs/day for 15.0 years (22.5 ttl pk-yrs)     Types: Cigarettes    Smokeless tobacco: Former   Substance Use Topics    Alcohol use: Yes     Alcohol/week: 3.0 - 6.0 standard drinks of alcohol     Types: 3 - 6 Standard drinks or equivalent per week       REVIEW OF SYSTEMS  CONSTITUTIONAL: negative for fever, chills, fatigue, or unintentional weight loss,   HEENT: negative for icteric sclera, eye pain/redness, or changes in vision/hearing  RESPIRATORY: negative for cough, hemoptysis, wheezing, orthopnea, or dyspnea on exertion  CARDIOVASCULAR: negative for chest pain, palpitations, or syncope   GASTROINTESTINAL: as noted per HPI  GENITOURINARY: negative for dysuria, polyuria, incontinence, or hematuria  MUSCULOSKELETAL: negative for arthralgia, myalgia, or joint swelling/stiffness   INTEGUMENTARY/SKIN: negative jaundice, rash, or skin lesion  HEMATOLOGIC/LYMPHATIC: negative for prolonged bleeding, easy bruising, or swollen lymph nodes  ENDOCRINE: negative for cold/heat intolerance, polydipsia, polyuria, or goiter  NEUROLOGIC: negative for headaches, dizziness, tremor, or gait abnormality  PSYCHIATRIC: negative for anxiety, depression, personality changes, or sleep disturbances      A 10 point review of systems was completed and was  "otherwise negative.    ALLERGIES  Allergies   Allergen Reactions    Amoxicillin Hives and Swelling    Penicillins Hives       MEDICATIONS  Current Outpatient Medications   Medication Sig Dispense Refill    albuterol (Proventil HFA) 90 mcg/actuation inhaler Inhale 2 puffs every 6 hours if needed for wheezing.      albuterol 2.5 mg /3 mL (0.083 %) nebulizer solution Take 3 mL (2.5 mg) by nebulization 4 times a day as needed for wheezing or shortness of breath. 9 mL 11    aspirin 81 mg EC tablet Take 1 tablet (81 mg) by mouth once daily.      fluticasone (Flonase) 50 mcg/actuation nasal spray Administer 1 spray into each nostril once daily. 16 g 1    folic acid (Folvite) 1 mg tablet TAKE TWO TABLETS BY MOUTH once a  tablet 1    hydroCHLOROthiazide (HYDRODiuril) 25 mg tablet Take 0.5 tablets (12.5 mg) by mouth once daily. 45 tablet 1    hydroxychloroquine (Plaquenil) 200 mg tablet TAKE ONE TABLET BY MOUTH TWO TIMES A  tablet 1    losartan (Cozaar) 50 mg tablet Take 1 tablet (50 mg) by mouth once daily. 90 tablet 1    montelukast (Singulair) 10 mg tablet Take 1 tablet (10 mg) by mouth once every 24 hours. 90 tablet 3    multivitamin tablet Take 1 tablet by mouth once daily.      omeprazole (PriLOSEC) 20 mg DR capsule TAKE ONE CAPSULE BY MOUTH DAILY 90 capsule 2    potassium chloride CR 10 mEq ER tablet Take 1 tablet (10 mEq) by mouth once daily. with food      rosuvastatin (Crestor) 10 mg tablet Take 1 tablet (10 mg) by mouth once daily. 90 tablet 3    semaglutide (Rybelsus) 7 mg tablet Take 1 tablet (7 mg) by mouth once daily. 30 tablet 1    simethicone (Mylicon,Gas-X) 125 mg capsule Take 1 capsule (125 mg) by mouth every 6 hours if needed for flatulence (or bloating). 28 capsule 0    desipramine (Norpramin) 10 mg tablet Take 1 tablet (10 mg) by mouth once daily at bedtime. 30 tablet 2     No current facility-administered medications for this visit.       VITALS  Pulse 86   Ht 1.6 m (5' 3\")   Wt 72.1 kg " (159 lb)   BMI 28.17 kg/m²      PHYSICAL EXAM  Alert and oriented in no acute distress    ASSESSMENT/ PLAN  Patient with abdominal bloating and functional dyspepsia.  Given the symptoms will place on low-dose desipramine 10 mg daily.  Dietary modifications have not helped.  Because she has some underlying chronic constipation we will further increase her MiraLAX to once every other day.  Will pursue upper endoscopy given the symptoms and her history of GERD and nondysplastic Huizar's esophagus in the past.  She is in agreement with the plan.  She will follow-up with me at endoscopy.        Signature: Tabitha Douglas MD    Date: 8/28/2024  Time: 3:15 PM

## 2024-08-29 DIAGNOSIS — M06.9 RHEUMATOID ARTHRITIS, INVOLVING UNSPECIFIED SITE, UNSPECIFIED WHETHER RHEUMATOID FACTOR PRESENT (MULTI): ICD-10-CM

## 2024-08-30 RX ORDER — FOLIC ACID 1 MG/1
2 TABLET ORAL DAILY
Qty: 180 TABLET | Refills: 1 | Status: SHIPPED | OUTPATIENT
Start: 2024-08-30

## 2024-09-17 ENCOUNTER — APPOINTMENT (OUTPATIENT)
Dept: OTOLARYNGOLOGY | Facility: CLINIC | Age: 64
End: 2024-09-17
Payer: COMMERCIAL

## 2024-09-17 VITALS — BODY MASS INDEX: 28.17 KG/M2 | HEIGHT: 63 IN | WEIGHT: 159 LBS

## 2024-09-17 DIAGNOSIS — J32.9 CHRONIC SINUSITIS, UNSPECIFIED LOCATION: ICD-10-CM

## 2024-09-17 DIAGNOSIS — H90.3 SENSORINEURAL HEARING LOSS (SNHL) OF BOTH EARS: ICD-10-CM

## 2024-09-17 DIAGNOSIS — E04.1 THYROID NODULE: ICD-10-CM

## 2024-09-17 DIAGNOSIS — R43.9 DISTURBANCES OF SENSATION OF SMELL AND TASTE: ICD-10-CM

## 2024-09-17 DIAGNOSIS — J32.4 CHRONIC PANSINUSITIS: Primary | ICD-10-CM

## 2024-09-17 DIAGNOSIS — J31.0 CHRONIC RHINITIS: ICD-10-CM

## 2024-09-17 DIAGNOSIS — J45.20 MILD INTERMITTENT REACTIVE AIRWAY DISEASE WITHOUT COMPLICATION (HHS-HCC): Primary | ICD-10-CM

## 2024-09-17 PROCEDURE — 3008F BODY MASS INDEX DOCD: CPT | Performed by: OTOLARYNGOLOGY

## 2024-09-17 PROCEDURE — 99204 OFFICE O/P NEW MOD 45 MIN: CPT | Performed by: OTOLARYNGOLOGY

## 2024-09-17 PROCEDURE — 1157F ADVNC CARE PLAN IN RCRD: CPT | Performed by: OTOLARYNGOLOGY

## 2024-09-17 PROCEDURE — 1159F MED LIST DOCD IN RCRD: CPT | Performed by: OTOLARYNGOLOGY

## 2024-09-17 PROCEDURE — 1123F ACP DISCUSS/DSCN MKR DOCD: CPT | Performed by: OTOLARYNGOLOGY

## 2024-09-17 PROCEDURE — 1036F TOBACCO NON-USER: CPT | Performed by: OTOLARYNGOLOGY

## 2024-09-17 RX ORDER — ALBUTEROL SULFATE 90 UG/1
2 INHALANT RESPIRATORY (INHALATION) EVERY 6 HOURS PRN
Qty: 18 G | Refills: 1 | Status: SHIPPED | OUTPATIENT
Start: 2024-09-17

## 2024-09-17 NOTE — TELEPHONE ENCOUNTER
Prescription request received and populated   Pharmacy populated  Last Office Visit: 6/26/24 for med follow up  Last CPE 1/08/24    Please also advise on previous iron supplement message:    Selam Mccarthy [id number] Clinical Support Staff (supporting Samuel Colby MD)   BS      8/29/24  1:51 PM  Would like a referral for physical therapy for my neuropathy, balance   Also I have started otc iron supplement when should I have follow up lab work

## 2024-09-17 NOTE — TELEPHONE ENCOUNTER
PT REQUESTING REFILL ALBUTEROL INHALER SENT TO [facility] PT ALSO SENT MESSAGE A FEW WEEKS AGO IN REGARDS TO IRON MEDICATION SHE IS ON AND DID NOT HEAR ANYTHING BACK

## 2024-09-17 NOTE — PROGRESS NOTES
Chief Complaint   Patient presents with    New Patient Visit     NP- SINUS ISSUES, THYROID CK, HAD US DONE ON 2024 AND LAB WORK. SEEING PCP ABOUT THYROID      Date of Evaluation: 2024   HPI  Selam Eubanks is a 64 y.o. female here for evaluation of her sinuses and thyroid.  For about a year or 2 she has felt chronic nasal congestion rhinorrhea and on occasion purulent nasal discharge.  She feels as though her left ear is plugged.  Her sense of smell has been gone for 1 year.  She had negative respiratory allergy blood test.  She has been using Nasonex on a daily basis without benefit.  She had a negative food allergy panel.  She has a remote history of smoking.  She had a thyroid ultrasound done which shows a T RADS five 8 mm right thyroid nodule.  Her TSH is normal       Past Medical History:   Diagnosis Date    Abnormal finding of blood chemistry, unspecified 2018    Abnormal blood chemistry    Abnormal levels of other serum enzymes 2015    Abnormal AST and ALT    Acute bronchitis, unspecified 2019    Acute exacerbation of chronic bronchitis    Acute bronchitis, unspecified 2018    Acute bronchitis due to infection    Acute maxillary sinusitis, unspecified 2019    Acute maxillary sinusitis    Acute maxillary sinusitis, unspecified 2019    Acute maxillary sinusitis    Acute upper respiratory infection, unspecified 2020    Viral URI with cough    Allergic     Anxiety     Arthritis     Asthma ([facility])     Huizar's esophagus without dysplasia 2017    Huizar's esophagus without dysplasia    Huizar's esophagus without dysplasia 2016    Huizar's esophagus without dysplasia    Huizar's esophagus without dysplasia 2016    Huizar's esophagus    Chronic maxillary sinusitis 2019    Maxillary sinusitis    Encounter for full-term uncomplicated delivery ([facility])      (spontaneous vaginal delivery)    GERD (gastroesophageal reflux disease)      Hypertension     Other conditions influencing health status     Complete Colonoscopy    Other general symptoms and signs 03/12/2020    Influenza-like symptoms    Other long term (current) drug therapy 10/21/2020    Chronic use of benzodiazepine for therapeutic purpose    Other specified anxiety disorders 07/25/2016    Depression with anxiety    Personal history of other diseases of the digestive system 03/29/2016    History of esophageal reflux    Personal history of other diseases of the digestive system 11/30/2015    History of hiatal hernia    Personal history of other diseases of the digestive system 09/22/2020    History of gastroesophageal reflux (GERD)    Personal history of other diseases of the musculoskeletal system and connective tissue 08/25/2022    History of rheumatoid arthritis    Personal history of other diseases of the nervous system and sense organs     History of blepharitis    Personal history of other diseases of the nervous system and sense organs 05/14/2014    History of carpal tunnel syndrome    Personal history of other diseases of the respiratory system 09/20/2017    History of sinusitis    Personal history of other diseases of the respiratory system 09/16/2019    History of acute bronchitis with bronchospasm    Personal history of other diseases of the respiratory system 03/12/2020    History of influenza    Personal history of other diseases of the respiratory system 10/21/2018    History of bronchitis    Personal history of other specified conditions 02/02/2018    History of persistent cough    Pleurodynia 02/02/2018    Rib pain on left side    Radiculopathy, lumbosacral region 07/30/2020    Lumbosacral radiculitis    Spinal stenosis, lumbar region without neurogenic claudication 06/05/2020    Stenosis of lateral recess of lumbar spine    Trigger finger, right ring finger 08/20/2019    Trigger ring finger of right hand    Trigger finger, unspecified finger 05/15/2013    Trigger finger,  "acquired    Vitamin D deficiency, unspecified 07/25/2016    Vitamin D deficiency      Past Surgical History:   Procedure Laterality Date    ADENOIDECTOMY      BACK SURGERY      COLONOSCOPY  03/20/2017    Colonoscopy (Fiberoptic)    CT ANGIO NECK  11/15/2021    CT NECK ANGIO W AND WO IV CONTRAST 11/15/2021 [facility]    MOUTH SURGERY  03/20/2017    Oral Surgery Tooth Extraction    OTHER SURGICAL HISTORY  04/01/2015    Drainage Of Ovarian Cyst(S)    OTHER SURGICAL HISTORY  03/20/2017    Endoscopy - Papillotomy    SPINE SURGERY      TONSILLECTOMY  03/20/2017    Tonsillectomy With Adenoidectomy    TUBAL LIGATION  04/01/2015    Tubal Ligation          Medications:   Current Outpatient Medications   Medication Instructions    albuterol (Proventil HFA) 90 mcg/actuation inhaler 2 puffs, inhalation, Every 6 hours PRN    albuterol 2.5 mg, nebulization, 4 times daily PRN    aspirin 81 mg, oral, Daily    desipramine (NORPRAMIN) 10 mg, oral, Nightly    fluticasone (Flonase) 50 mcg/actuation nasal spray 1 spray, Each Nostril, Daily    folic acid (FOLVITE) 2 mg, oral, Daily    hydroCHLOROthiazide (HYDRODIURIL) 12.5 mg, oral, Daily    hydroxychloroquine (Plaquenil) 200 mg tablet oral, 2 times daily    losartan (COZAAR) 50 mg, oral, Daily    montelukast (SINGULAIR) 10 mg, oral, Every 24 hours    multivitamin tablet 1 tablet, oral, Daily    omeprazole (PRILOSEC) 20 mg, oral, Daily    potassium chloride CR 10 mEq ER tablet 10 mEq, oral, Daily, with food    rosuvastatin (CRESTOR) 10 mg, oral, Daily    semaglutide (RYBELSUS) 7 mg, oral, Daily    simethicone (MYLICON,GAS-X) 125 mg, oral, Every 6 hours PRN        Allergies:  Allergies   Allergen Reactions    Amoxicillin Hives and Swelling    Penicillins Hives        Physical Exam:  Last Recorded Vitals  Height 1.6 m (5' 3\"), weight 72.1 kg (159 lb).  []General appearance: Well-developed, well-nourished in no acute distress, conversant with normal voice quality    Head/face: No " erythema or edema or facial tenderness, and normal facial nerve function bilaterally    External ear: Clear external auditory canals with normal pinnae  Tube status: N/A  Middle ear: Tympanic membranes intact and mobile, middle ears normal.  Tympanic membrane perforation: N/A  Mastoid bowl: N/A  Hearing: Normal conversational awareness at normal speech thresholds    Nose visualized using: Anterior rhinoscopy  Nasal dorsum: Nontraumatic midline appearance  Septum: Midline, nonobstructing  Inferior turbinates: Normal, pink  Secretions: Clear nasal drainage    Oral cavity and oropharynx: Normal  Teeth: Good condition  Floor of mouth: without lesions  Palate: Normal hard palate, soft palate and uvula  Oropharynx: Clear, no lesions present  Buccal mucosa: Normal without masses or lesions  Lips: Normal    Nasopharynx: Inadequate mirror exam secondary to gag/anatomy    Neck:  Salivary glands: Normal bilateral parotid and submandibular glands by inspection and palpation.  Non-thyroid masses: No palpable masses or significant lymphadenopathy  Trachea: Midline  Thyroid: No thyromegaly or palpable nodules  Temporomandibular joint: Nontender  Cervical range of motion: Normal    Neurologic exam: Alert and oriented x3, appropriate affect.  Cranial nerves II-XII normal bilaterally  Extraocular movement: Extraocular movement intact, normal gaze alignment        Selam was seen today for new patient visit.  Diagnoses and all orders for this visit:  Chronic pansinusitis (Primary)  Disturbances of sensation of smell and taste  Chronic rhinitis  Thyroid nodule  Chronic sinusitis, unspecified location  Sensorineural hearing loss (SNHL) of both ears       PLAN  She is complaining of decreased hearing and plugging sensation in the left ear.  Her ear exam is normal.  I would like to see her back with an audiogram.  I will check a CT scan of the paranasal sinuses to rule out chronic sinusitis given her chronic complaints and decreased  sense of smell.  We discussed her thyroid nodule in great detail.  Recommendations for a subcentimeter T rad 5 nodule is to repeat ultrasound in 1 year.    Alcides Sawyer MD

## 2024-09-20 ENCOUNTER — TELEPHONE (OUTPATIENT)
Dept: PRIMARY CARE | Facility: CLINIC | Age: 64
End: 2024-09-20
Payer: COMMERCIAL

## 2024-09-20 NOTE — TELEPHONE ENCOUNTER
"MARISSA [phone] EXT [id number] FROM [facility] OFFICE REACH OUT TO THE PATIENT NOT \" ADHERING \" TO CORDELLS.   "

## 2024-09-23 NOTE — TELEPHONE ENCOUNTER
Spoke with patient. Patient states she stopped Rybelsus several months ago. States rx made her feel nauseated. Patient states she has been having stomach issues and is going for endoscopy Monday.

## 2024-09-30 ENCOUNTER — APPOINTMENT (OUTPATIENT)
Dept: GASTROENTEROLOGY | Facility: EXTERNAL LOCATION | Age: 64
End: 2024-09-30
Payer: COMMERCIAL

## 2024-10-03 ENCOUNTER — HOSPITAL ENCOUNTER (OUTPATIENT)
Dept: RADIOLOGY | Facility: CLINIC | Age: 64
Discharge: HOME | End: 2024-10-03
Payer: COMMERCIAL

## 2024-10-03 DIAGNOSIS — J32.9 CHRONIC SINUSITIS, UNSPECIFIED LOCATION: ICD-10-CM

## 2024-10-03 PROCEDURE — 70486 CT MAXILLOFACIAL W/O DYE: CPT

## 2024-10-07 ENCOUNTER — TELEPHONE (OUTPATIENT)
Dept: PRIMARY CARE | Facility: CLINIC | Age: 64
End: 2024-10-07

## 2024-10-07 NOTE — TELEPHONE ENCOUNTER
[facility] ILENE [phone] EX [phone] WANTS TO CONFIRM RYBELSUS 7 MG IF ACTIVE THEY WANT OFFICE TO REACH OUT TO PT AND IF NOT CALL BACK AND PROVIDE REASON FOR DISCONTINUATION    Male

## 2024-10-08 NOTE — TELEPHONE ENCOUNTER
Per previous message:    September 23, 2024  Me      9/23/24 12:03 PM  Note     Spoke with patient. Patient states she stopped Rybelsus several months ago. States rx made her feel nauseated. Patient states she has been having stomach issues and is going for endoscopy Monday.

## 2024-10-11 DIAGNOSIS — I10 PRIMARY HYPERTENSION: ICD-10-CM

## 2024-10-11 RX ORDER — HYDROCHLOROTHIAZIDE 25 MG/1
12.5 TABLET ORAL DAILY
Qty: 45 TABLET | Refills: 3 | Status: SHIPPED | OUTPATIENT
Start: 2024-10-11 | End: 2025-10-11

## 2024-10-15 ENCOUNTER — APPOINTMENT (OUTPATIENT)
Dept: OTOLARYNGOLOGY | Facility: CLINIC | Age: 64
End: 2024-10-15
Payer: COMMERCIAL

## 2024-10-15 ENCOUNTER — APPOINTMENT (OUTPATIENT)
Dept: AUDIOLOGY | Facility: CLINIC | Age: 64
End: 2024-10-15
Payer: COMMERCIAL

## 2024-10-15 DIAGNOSIS — I10 ESSENTIAL HYPERTENSION: Primary | ICD-10-CM

## 2024-10-15 RX ORDER — HYDROCHLOROTHIAZIDE 25 MG/1
25 TABLET ORAL DAILY
Qty: 90 TABLET | Refills: 3 | Status: SHIPPED | OUTPATIENT
Start: 2024-10-15 | End: 2025-10-15

## 2024-10-21 ENCOUNTER — APPOINTMENT (OUTPATIENT)
Dept: GASTROENTEROLOGY | Facility: EXTERNAL LOCATION | Age: 64
End: 2024-10-21
Payer: COMMERCIAL

## 2024-10-21 DIAGNOSIS — K21.9 GERD (GASTROESOPHAGEAL REFLUX DISEASE): ICD-10-CM

## 2024-10-21 DIAGNOSIS — K44.9 HIATAL HERNIA: ICD-10-CM

## 2024-10-21 DIAGNOSIS — K22.70 BARRETT'S ESOPHAGUS WITHOUT DYSPLASIA: Primary | ICD-10-CM

## 2024-10-21 DIAGNOSIS — K21.9 GASTROESOPHAGEAL REFLUX DISEASE WITHOUT ESOPHAGITIS: ICD-10-CM

## 2024-10-21 PROCEDURE — 43239 EGD BIOPSY SINGLE/MULTIPLE: CPT | Performed by: INTERNAL MEDICINE

## 2024-10-21 PROCEDURE — 1157F ADVNC CARE PLAN IN RCRD: CPT | Performed by: INTERNAL MEDICINE

## 2024-10-21 PROCEDURE — 88305 TISSUE EXAM BY PATHOLOGIST: CPT

## 2024-10-21 PROCEDURE — 88305 TISSUE EXAM BY PATHOLOGIST: CPT | Performed by: PATHOLOGY

## 2024-10-21 PROCEDURE — 1123F ACP DISCUSS/DSCN MKR DOCD: CPT | Performed by: INTERNAL MEDICINE

## 2024-10-22 ENCOUNTER — LAB REQUISITION (OUTPATIENT)
Dept: LAB | Facility: HOSPITAL | Age: 64
End: 2024-10-22
Payer: COMMERCIAL

## 2024-10-29 LAB
LABORATORY COMMENT REPORT: NORMAL
PATH REPORT.FINAL DX SPEC: NORMAL
PATH REPORT.GROSS SPEC: NORMAL
PATH REPORT.RELEVANT HX SPEC: NORMAL
PATH REPORT.TOTAL CANCER: NORMAL

## 2024-10-29 NOTE — RESULT ENCOUNTER NOTE
The biopsies that were performed in your esophagus were benign and did not show changes of Huizar's esophagus.  The recommendation is to repeat upper endoscopy in 3-5 years.      Tabitha Douglas MD

## 2024-11-27 DIAGNOSIS — I10 ESSENTIAL HYPERTENSION: ICD-10-CM

## 2024-11-27 RX ORDER — POTASSIUM CHLORIDE 750 MG/1
10 TABLET, EXTENDED RELEASE ORAL DAILY
Status: CANCELLED | OUTPATIENT
Start: 2024-11-27

## 2024-12-02 ENCOUNTER — APPOINTMENT (OUTPATIENT)
Dept: GASTROENTEROLOGY | Facility: CLINIC | Age: 64
End: 2024-12-02
Payer: COMMERCIAL

## 2024-12-02 VITALS — WEIGHT: 158 LBS | BODY MASS INDEX: 28 KG/M2 | HEIGHT: 63 IN

## 2024-12-02 DIAGNOSIS — K59.09 CHRONIC CONSTIPATION: ICD-10-CM

## 2024-12-02 DIAGNOSIS — R14.0 BLOATING: Primary | ICD-10-CM

## 2024-12-02 DIAGNOSIS — K58.0 IRRITABLE BOWEL SYNDROME WITH DIARRHEA: ICD-10-CM

## 2024-12-02 PROCEDURE — 1157F ADVNC CARE PLAN IN RCRD: CPT | Performed by: INTERNAL MEDICINE

## 2024-12-02 PROCEDURE — 1159F MED LIST DOCD IN RCRD: CPT | Performed by: INTERNAL MEDICINE

## 2024-12-02 PROCEDURE — 99214 OFFICE O/P EST MOD 30 MIN: CPT | Performed by: INTERNAL MEDICINE

## 2024-12-02 PROCEDURE — 3008F BODY MASS INDEX DOCD: CPT | Performed by: INTERNAL MEDICINE

## 2024-12-02 PROCEDURE — 1123F ACP DISCUSS/DSCN MKR DOCD: CPT | Performed by: INTERNAL MEDICINE

## 2024-12-02 PROCEDURE — 1160F RVW MEDS BY RX/DR IN RCRD: CPT | Performed by: INTERNAL MEDICINE

## 2024-12-02 NOTE — PROGRESS NOTES
REASON FOR VISIT: Follow-up abdominal symptoms    HPI:  Selam Eubanks is a 64 y.o. female with a past medical history of short segment nondysplastic Huizar's esophagus here to follow-up on recent symptoms of bloating and dyspepsia.  Recent upper endoscopy biopsy negative for Huizar's.  Has tried desipramine for dyspepsia and bloating without improvement and she discontinued it.  Has been taking MiraLAX and her constipation is improved.  Continues to have bloating throughout the day.  No unintentional weight loss but with some dieting has lost a few pounds.          PRIOR ENDOSCOPY    PAST MEDICAL HISTORY  Past Medical History:   Diagnosis Date    Abnormal finding of blood chemistry, unspecified 2018    Abnormal blood chemistry    Abnormal levels of other serum enzymes 2015    Abnormal AST and ALT    Acute bronchitis, unspecified 2019    Acute exacerbation of chronic bronchitis    Acute bronchitis, unspecified 2018    Acute bronchitis due to infection    Acute maxillary sinusitis, unspecified 2019    Acute maxillary sinusitis    Acute maxillary sinusitis, unspecified 2019    Acute maxillary sinusitis    Acute upper respiratory infection, unspecified 2020    Viral URI with cough    Allergic     Anxiety     Arthritis     Asthma     Huizar's esophagus without dysplasia 2017    Huizar's esophagus without dysplasia    Huizar's esophagus without dysplasia 2016    Huizar's esophagus without dysplasia    Huizar's esophagus without dysplasia 2016    Huizar's esophagus    Chronic maxillary sinusitis 2019    Maxillary sinusitis    Encounter for full-term uncomplicated delivery      (spontaneous vaginal delivery)    GERD (gastroesophageal reflux disease)     Hypertension     Other conditions influencing health status     Complete Colonoscopy    Other general symptoms and signs 2020    Influenza-like symptoms    Other long term (current) drug  therapy 10/21/2020    Chronic use of benzodiazepine for therapeutic purpose    Other specified anxiety disorders 07/25/2016    Depression with anxiety    Personal history of other diseases of the digestive system 03/29/2016    History of esophageal reflux    Personal history of other diseases of the digestive system 11/30/2015    History of hiatal hernia    Personal history of other diseases of the digestive system 09/22/2020    History of gastroesophageal reflux (GERD)    Personal history of other diseases of the musculoskeletal system and connective tissue 08/25/2022    History of rheumatoid arthritis    Personal history of other diseases of the nervous system and sense organs     History of blepharitis    Personal history of other diseases of the nervous system and sense organs 05/14/2014    History of carpal tunnel syndrome    Personal history of other diseases of the respiratory system 09/20/2017    History of sinusitis    Personal history of other diseases of the respiratory system 09/16/2019    History of acute bronchitis with bronchospasm    Personal history of other diseases of the respiratory system 03/12/2020    History of influenza    Personal history of other diseases of the respiratory system 10/21/2018    History of bronchitis    Personal history of other specified conditions 02/02/2018    History of persistent cough    Pleurodynia 02/02/2018    Rib pain on left side    Radiculopathy, lumbosacral region 07/30/2020    Lumbosacral radiculitis    Spinal stenosis, lumbar region without neurogenic claudication 06/05/2020    Stenosis of lateral recess of lumbar spine    Trigger finger, right ring finger 08/20/2019    Trigger ring finger of right hand    Trigger finger, unspecified finger 05/15/2013    Trigger finger, acquired    Vitamin D deficiency, unspecified 07/25/2016    Vitamin D deficiency       PAST SURGICAL HISTORY  Past Surgical History:   Procedure Laterality Date    ADENOIDECTOMY      BACK  SURGERY      COLONOSCOPY  03/20/2017    Colonoscopy (Fiberoptic)    CT ANGIO NECK  11/15/2021    CT NECK ANGIO W AND WO IV CONTRAST 11/15/2021 [facility]    MOUTH SURGERY  03/20/2017    Oral Surgery Tooth Extraction    OTHER SURGICAL HISTORY  04/01/2015    Drainage Of Ovarian Cyst(S)    OTHER SURGICAL HISTORY  03/20/2017    Endoscopy - Papillotomy    SPINE SURGERY      TONSILLECTOMY  03/20/2017    Tonsillectomy With Adenoidectomy    TUBAL LIGATION  04/01/2015    Tubal Ligation       FAMILY HISTORY  Family History   Problem Relation Name Age of Onset    Heart disease Father Ilir        SOCIAL HISTORY  Social History     Tobacco Use    Smoking status: Former     Current packs/day: 1.50     Average packs/day: 1.5 packs/day for 15.0 years (22.5 ttl pk-yrs)     Types: Cigarettes    Smokeless tobacco: Former   Substance Use Topics    Alcohol use: Yes     Alcohol/week: 3.0 - 6.0 standard drinks of alcohol     Types: 3 - 6 Standard drinks or equivalent per week       REVIEW OF SYSTEMS  CONSTITUTIONAL: negative for fever, chills, fatigue, or unintentional weight loss,   HEENT: negative for icteric sclera, eye pain/redness, or changes in vision/hearing  RESPIRATORY: negative for cough, hemoptysis, wheezing, orthopnea, or dyspnea on exertion  CARDIOVASCULAR: negative for chest pain, palpitations, or syncope   GASTROINTESTINAL: as noted per HPI  GENITOURINARY: negative for dysuria, polyuria, incontinence, or hematuria  MUSCULOSKELETAL: negative for arthralgia, myalgia, or joint swelling/stiffness   INTEGUMENTARY/SKIN: negative jaundice, rash, or skin lesion  HEMATOLOGIC/LYMPHATIC: negative for prolonged bleeding, easy bruising, or swollen lymph nodes  ENDOCRINE: negative for cold/heat intolerance, polydipsia, polyuria, or goiter  NEUROLOGIC: negative for headaches, dizziness, tremor, or gait abnormality  PSYCHIATRIC: negative for anxiety, depression, personality changes, or sleep disturbances      A 10 point review  of systems was completed and was otherwise negative.    ALLERGIES  Allergies   Allergen Reactions    Amoxicillin Hives and Swelling    Penicillins Hives       MEDICATIONS  Current Outpatient Medications   Medication Sig Dispense Refill    albuterol (Proventil HFA) 90 mcg/actuation inhaler Inhale 2 puffs every 6 hours if needed for wheezing. 18 g 1    albuterol 2.5 mg /3 mL (0.083 %) nebulizer solution Take 3 mL (2.5 mg) by nebulization 4 times a day as needed for wheezing or shortness of breath. 9 mL 11    aspirin 81 mg EC tablet Take 1 tablet (81 mg) by mouth once daily.      desipramine (Norpramin) 10 mg tablet Take 1 tablet (10 mg) by mouth once daily at bedtime. 30 tablet 2    fluticasone (Flonase) 50 mcg/actuation nasal spray Administer 1 spray into each nostril once daily. 16 g 1    folic acid (Folvite) 1 mg tablet TAKE TWO TABLETS BY MOUTH ONCE A DAY (Patient not taking: Reported on 12/2/2024) 180 tablet 1    hydroCHLOROthiazide (HYDRODiuril) 25 mg tablet Take 1 tablet (25 mg) by mouth once daily. 90 tablet 3    hydroxychloroquine (Plaquenil) 200 mg tablet TAKE ONE TABLET BY MOUTH TWO TIMES A  tablet 1    losartan (Cozaar) 50 mg tablet Take 1 tablet (50 mg) by mouth once daily. 90 tablet 1    montelukast (Singulair) 10 mg tablet Take 1 tablet (10 mg) by mouth once every 24 hours. 90 tablet 3    multivitamin tablet Take 1 tablet by mouth once daily.      omeprazole (PriLOSEC) 20 mg DR capsule TAKE ONE CAPSULE BY MOUTH DAILY 90 capsule 2    potassium chloride CR 10 mEq ER tablet Take 1 tablet (10 mEq) by mouth once daily. with food      rifAXIMin (Xifaxan) 550 mg tablet Take 1 tablet (550 mg) by mouth 3 times a day for 14 days. 42 tablet 0    rosuvastatin (Crestor) 10 mg tablet Take 1 tablet (10 mg) by mouth once daily. 90 tablet 3    simethicone (Mylicon,Gas-X) 125 mg capsule Take 1 capsule (125 mg) by mouth every 6 hours if needed for flatulence (or bloating). (Patient not taking: Reported on  "12/2/2024) 28 capsule 0     No current facility-administered medications for this visit.       VITALS  Ht 1.6 m (5' 3\")   Wt 71.7 kg (158 lb)   BMI 27.99 kg/m²      PHYSICAL EXAM  Alert and oriented in no acute distress    ASSESSMENT/ PLAN  Patient with dyspepsia and bloating symptoms.  No response to desipramine.  Also history of constipation in the past on MiraLAX.  Given her symptoms that are persistent we will place on Xifaxan 550 mg 3 times a day for 14 days.  If unable to obtain due to lack of insurance coverage then we will try to obtain samples.  She will continue with her MiraLAX for history of constipation.  She is in agreement with the plan.        Signature: Tabitha Douglas MD    Date: 12/2/2024  Time: 3:47 PM    "

## 2024-12-04 ENCOUNTER — SPECIALTY PHARMACY (OUTPATIENT)
Dept: PHARMACY | Facility: CLINIC | Age: 64
End: 2024-12-04

## 2024-12-04 DIAGNOSIS — K58.0 IRRITABLE BOWEL SYNDROME WITH DIARRHEA: ICD-10-CM

## 2024-12-06 RX ORDER — HYDROCHLOROTHIAZIDE 25 MG/1
25 TABLET ORAL DAILY
Qty: 90 TABLET | Refills: 3 | Status: SHIPPED | OUTPATIENT
Start: 2024-12-06 | End: 2025-12-06

## 2024-12-09 ENCOUNTER — APPOINTMENT (OUTPATIENT)
Dept: GASTROENTEROLOGY | Facility: CLINIC | Age: 64
End: 2024-12-09
Payer: COMMERCIAL

## 2024-12-10 DIAGNOSIS — I10 PRIMARY HYPERTENSION: ICD-10-CM

## 2024-12-10 RX ORDER — POTASSIUM CHLORIDE 750 MG/1
10 TABLET, FILM COATED, EXTENDED RELEASE ORAL DAILY
Qty: 90 TABLET | Refills: 1 | Status: SHIPPED | OUTPATIENT
Start: 2024-12-10

## 2024-12-16 DIAGNOSIS — I10 PRIMARY HYPERTENSION: ICD-10-CM

## 2024-12-17 ENCOUNTER — PATIENT MESSAGE (OUTPATIENT)
Dept: PRIMARY CARE | Facility: CLINIC | Age: 64
End: 2024-12-17
Payer: COMMERCIAL

## 2024-12-17 DIAGNOSIS — I10 PRIMARY HYPERTENSION: ICD-10-CM

## 2024-12-17 RX ORDER — LOSARTAN POTASSIUM 50 MG/1
50 TABLET ORAL DAILY
Qty: 90 TABLET | Refills: 0 | Status: SHIPPED | OUTPATIENT
Start: 2024-12-17

## 2024-12-21 RX ORDER — LOSARTAN POTASSIUM 50 MG/1
50 TABLET ORAL DAILY
Qty: 90 TABLET | Refills: 0 | OUTPATIENT
Start: 2024-12-21

## 2025-01-06 ENCOUNTER — OFFICE VISIT (OUTPATIENT)
Dept: RHEUMATOLOGY | Facility: CLINIC | Age: 65
End: 2025-01-06
Payer: MEDICARE

## 2025-01-06 VITALS — BODY MASS INDEX: 26.93 KG/M2 | SYSTOLIC BLOOD PRESSURE: 120 MMHG | DIASTOLIC BLOOD PRESSURE: 82 MMHG | WEIGHT: 152 LBS

## 2025-01-06 DIAGNOSIS — M06.9 RHEUMATOID ARTHRITIS, INVOLVING UNSPECIFIED SITE, UNSPECIFIED WHETHER RHEUMATOID FACTOR PRESENT (MULTI): ICD-10-CM

## 2025-01-06 PROCEDURE — 1157F ADVNC CARE PLAN IN RCRD: CPT | Performed by: INTERNAL MEDICINE

## 2025-01-06 PROCEDURE — 99214 OFFICE O/P EST MOD 30 MIN: CPT | Performed by: INTERNAL MEDICINE

## 2025-01-06 PROCEDURE — 1123F ACP DISCUSS/DSCN MKR DOCD: CPT | Performed by: INTERNAL MEDICINE

## 2025-01-06 PROCEDURE — 3079F DIAST BP 80-89 MM HG: CPT | Performed by: INTERNAL MEDICINE

## 2025-01-06 PROCEDURE — 3074F SYST BP LT 130 MM HG: CPT | Performed by: INTERNAL MEDICINE

## 2025-01-06 PROCEDURE — 1159F MED LIST DOCD IN RCRD: CPT | Performed by: INTERNAL MEDICINE

## 2025-01-06 NOTE — PROGRESS NOTES
Recheck  OA  /  RA    doing well overall.  Workup for diaphragm issues ( Dr. Douglas )    HPI - she had 3d of knee and ankle swelling that resolved without intervention.  Hands sometimes.  Incr pain with cold weather.  AM stiffness 1 hr.   No CP or resp.  Ongoing bloating  She is drinking 2 glasses of wine to help with sleep    PE  NAD  RRR no r/m/g  CTA  No edema  No synovitis  NT and no pain with ROM throughout    A/P - OA and RA - intermittent pain, incr with weather changes but no current inflammation  Cautioned pt against wine for sleep - more than recommended EtOH intake, plus EtOH is disruptive to sleep  Reviewed prev labs - anemia.  Primary has ordered Fe studies  Seeing GI for dyspepsia and bloating   7/24 DEXA didn't meet frax criteria  Follow up 6 mo or sooner PRN

## 2025-01-28 ENCOUNTER — SPECIALTY PHARMACY (OUTPATIENT)
Dept: PHARMACY | Facility: CLINIC | Age: 65
End: 2025-01-28

## 2025-02-04 ENCOUNTER — APPOINTMENT (OUTPATIENT)
Dept: AUDIOLOGY | Facility: CLINIC | Age: 65
End: 2025-02-04
Payer: COMMERCIAL

## 2025-02-04 ENCOUNTER — APPOINTMENT (OUTPATIENT)
Dept: OTOLARYNGOLOGY | Facility: CLINIC | Age: 65
End: 2025-02-04
Payer: COMMERCIAL

## 2025-02-14 ENCOUNTER — TELEPHONE (OUTPATIENT)
Dept: RADIOLOGY | Facility: HOSPITAL | Age: 65
End: 2025-02-14
Payer: MEDICARE

## 2025-02-17 DIAGNOSIS — M06.9 RHEUMATOID ARTHRITIS, INVOLVING UNSPECIFIED SITE, UNSPECIFIED WHETHER RHEUMATOID FACTOR PRESENT (MULTI): ICD-10-CM

## 2025-02-17 RX ORDER — HYDROXYCHLOROQUINE SULFATE 200 MG/1
200 TABLET, FILM COATED ORAL 2 TIMES DAILY
Qty: 180 TABLET | Refills: 1 | Status: SHIPPED | OUTPATIENT
Start: 2025-02-17

## 2025-02-24 ENCOUNTER — APPOINTMENT (OUTPATIENT)
Dept: RADIOLOGY | Facility: CLINIC | Age: 65
End: 2025-02-24
Payer: MEDICARE

## 2025-02-25 PROBLEM — J98.8 CONGESTION OF RESPIRATORY TRACT: Status: RESOLVED | Noted: 2025-02-25 | Resolved: 2025-02-25

## 2025-02-25 PROBLEM — M89.9 DISORDER OF BONE: Status: ACTIVE | Noted: 2025-02-25

## 2025-02-25 PROBLEM — M79.10 MUSCLE PAIN: Status: RESOLVED | Noted: 2025-02-25 | Resolved: 2025-02-25

## 2025-02-25 PROBLEM — J20.9 ACUTE EXACERBATION OF CHRONIC BRONCHITIS (MULTI): Status: RESOLVED | Noted: 2025-02-25 | Resolved: 2025-02-25

## 2025-02-25 PROBLEM — R05.3 PERSISTENT COUGH: Status: RESOLVED | Noted: 2025-02-25 | Resolved: 2025-02-25

## 2025-02-25 PROBLEM — M99.09 SEGMENTAL AND SOMATIC DYSFUNCTION: Status: RESOLVED | Noted: 2025-02-25 | Resolved: 2025-02-25

## 2025-02-25 PROBLEM — J42 ACUTE EXACERBATION OF CHRONIC BRONCHITIS (MULTI): Status: RESOLVED | Noted: 2025-02-25 | Resolved: 2025-02-25

## 2025-02-25 PROBLEM — M05.9 SEROPOSITIVE RHEUMATOID ARTHRITIS: Status: RESOLVED | Noted: 2025-02-25 | Resolved: 2025-02-25

## 2025-02-25 PROBLEM — J40 BRONCHITIS: Status: RESOLVED | Noted: 2025-02-25 | Resolved: 2025-02-25

## 2025-02-25 PROBLEM — J11.1 INFLUENZA: Status: RESOLVED | Noted: 2025-02-25 | Resolved: 2025-02-25

## 2025-02-25 PROBLEM — H52.7 DISORDER OF REFRACTION: Status: ACTIVE | Noted: 2025-02-25

## 2025-02-25 PROBLEM — N39.0 URINARY TRACT INFECTION: Status: RESOLVED | Noted: 2025-02-25 | Resolved: 2025-02-25

## 2025-02-25 PROBLEM — M25.559 ARTHRALGIA OF HIP: Status: RESOLVED | Noted: 2025-02-25 | Resolved: 2025-02-25

## 2025-02-26 ENCOUNTER — APPOINTMENT (OUTPATIENT)
Dept: PRIMARY CARE | Facility: CLINIC | Age: 65
End: 2025-02-26
Payer: COMMERCIAL

## 2025-02-27 ENCOUNTER — APPOINTMENT (OUTPATIENT)
Dept: RADIOLOGY | Facility: CLINIC | Age: 65
End: 2025-02-27
Payer: MEDICARE

## 2025-03-05 ENCOUNTER — APPOINTMENT (OUTPATIENT)
Dept: GASTROENTEROLOGY | Facility: CLINIC | Age: 65
End: 2025-03-05
Payer: MEDICARE

## 2025-03-06 ENCOUNTER — HOSPITAL ENCOUNTER (OUTPATIENT)
Dept: RADIOLOGY | Facility: CLINIC | Age: 65
Discharge: HOME | End: 2025-03-06
Payer: MEDICARE

## 2025-03-06 DIAGNOSIS — E04.1 THYROID NODULE: ICD-10-CM

## 2025-03-17 DIAGNOSIS — M06.9 RHEUMATOID ARTHRITIS, INVOLVING UNSPECIFIED SITE, UNSPECIFIED WHETHER RHEUMATOID FACTOR PRESENT (MULTI): ICD-10-CM

## 2025-03-17 RX ORDER — FOLIC ACID 1 MG/1
2 TABLET ORAL DAILY
Qty: 180 TABLET | Refills: 1 | Status: SHIPPED | OUTPATIENT
Start: 2025-03-17 | End: 2025-03-17 | Stop reason: SDUPTHER

## 2025-03-17 RX ORDER — FOLIC ACID 1 MG/1
2 TABLET ORAL DAILY
Qty: 180 TABLET | Refills: 1 | Status: SHIPPED | OUTPATIENT
Start: 2025-03-17

## 2025-03-19 ENCOUNTER — PATIENT MESSAGE (OUTPATIENT)
Dept: PRIMARY CARE | Facility: CLINIC | Age: 65
End: 2025-03-19

## 2025-03-19 ENCOUNTER — APPOINTMENT (OUTPATIENT)
Dept: PRIMARY CARE | Facility: CLINIC | Age: 65
End: 2025-03-19
Payer: MEDICARE

## 2025-03-19 ENCOUNTER — TELEPHONE (OUTPATIENT)
Dept: PRIMARY CARE | Facility: CLINIC | Age: 65
End: 2025-03-19

## 2025-03-19 VITALS
SYSTOLIC BLOOD PRESSURE: 142 MMHG | WEIGHT: 154 LBS | OXYGEN SATURATION: 96 % | DIASTOLIC BLOOD PRESSURE: 80 MMHG | HEART RATE: 91 BPM | HEIGHT: 63 IN | TEMPERATURE: 97.5 F | BODY MASS INDEX: 27.29 KG/M2

## 2025-03-19 DIAGNOSIS — I10 PRIMARY HYPERTENSION: Primary | ICD-10-CM

## 2025-03-19 DIAGNOSIS — R14.0 ABDOMINAL BLOATING: ICD-10-CM

## 2025-03-19 DIAGNOSIS — Z76.89 ENCOUNTER TO ESTABLISH CARE: ICD-10-CM

## 2025-03-19 DIAGNOSIS — E78.5 HYPERLIPIDEMIA, UNSPECIFIED HYPERLIPIDEMIA TYPE: ICD-10-CM

## 2025-03-19 DIAGNOSIS — I10 PRIMARY HYPERTENSION: ICD-10-CM

## 2025-03-19 DIAGNOSIS — D50.9 MICROCYTIC ANEMIA: ICD-10-CM

## 2025-03-19 DIAGNOSIS — Z00.00 ROUTINE GENERAL MEDICAL EXAMINATION AT HEALTH CARE FACILITY: Primary | ICD-10-CM

## 2025-03-19 PROCEDURE — G0439 PPPS, SUBSEQ VISIT: HCPCS | Performed by: STUDENT IN AN ORGANIZED HEALTH CARE EDUCATION/TRAINING PROGRAM

## 2025-03-19 PROCEDURE — 1160F RVW MEDS BY RX/DR IN RCRD: CPT | Performed by: STUDENT IN AN ORGANIZED HEALTH CARE EDUCATION/TRAINING PROGRAM

## 2025-03-19 PROCEDURE — 1157F ADVNC CARE PLAN IN RCRD: CPT | Performed by: STUDENT IN AN ORGANIZED HEALTH CARE EDUCATION/TRAINING PROGRAM

## 2025-03-19 PROCEDURE — 3078F DIAST BP <80 MM HG: CPT | Performed by: STUDENT IN AN ORGANIZED HEALTH CARE EDUCATION/TRAINING PROGRAM

## 2025-03-19 PROCEDURE — 3008F BODY MASS INDEX DOCD: CPT | Performed by: STUDENT IN AN ORGANIZED HEALTH CARE EDUCATION/TRAINING PROGRAM

## 2025-03-19 PROCEDURE — 99397 PER PM REEVAL EST PAT 65+ YR: CPT | Performed by: STUDENT IN AN ORGANIZED HEALTH CARE EDUCATION/TRAINING PROGRAM

## 2025-03-19 PROCEDURE — 3077F SYST BP >= 140 MM HG: CPT | Performed by: STUDENT IN AN ORGANIZED HEALTH CARE EDUCATION/TRAINING PROGRAM

## 2025-03-19 PROCEDURE — 99214 OFFICE O/P EST MOD 30 MIN: CPT | Performed by: STUDENT IN AN ORGANIZED HEALTH CARE EDUCATION/TRAINING PROGRAM

## 2025-03-19 PROCEDURE — 1123F ACP DISCUSS/DSCN MKR DOCD: CPT | Performed by: STUDENT IN AN ORGANIZED HEALTH CARE EDUCATION/TRAINING PROGRAM

## 2025-03-19 PROCEDURE — 1125F AMNT PAIN NOTED PAIN PRSNT: CPT | Performed by: STUDENT IN AN ORGANIZED HEALTH CARE EDUCATION/TRAINING PROGRAM

## 2025-03-19 PROCEDURE — 1170F FXNL STATUS ASSESSED: CPT | Performed by: STUDENT IN AN ORGANIZED HEALTH CARE EDUCATION/TRAINING PROGRAM

## 2025-03-19 PROCEDURE — 1159F MED LIST DOCD IN RCRD: CPT | Performed by: STUDENT IN AN ORGANIZED HEALTH CARE EDUCATION/TRAINING PROGRAM

## 2025-03-19 ASSESSMENT — ACTIVITIES OF DAILY LIVING (ADL)
GROCERY_SHOPPING: INDEPENDENT
TAKING_MEDICATION: INDEPENDENT
DOING_HOUSEWORK: INDEPENDENT
DRESSING: INDEPENDENT
MANAGING_FINANCES: INDEPENDENT
BATHING: INDEPENDENT

## 2025-03-19 ASSESSMENT — ENCOUNTER SYMPTOMS
FEVER: 0
OCCASIONAL FEELINGS OF UNSTEADINESS: 0
DYSPHORIC MOOD: 0
ABDOMINAL PAIN: 1
COUGH: 0
PALPITATIONS: 0
CHILLS: 0
DIARRHEA: 0
SHORTNESS OF BREATH: 0
LOSS OF SENSATION IN FEET: 0
TROUBLE SWALLOWING: 0
DEPRESSION: 0
DYSURIA: 0
CONSTIPATION: 1
BLOOD IN STOOL: 0

## 2025-03-19 ASSESSMENT — PATIENT HEALTH QUESTIONNAIRE - PHQ9
2. FEELING DOWN, DEPRESSED OR HOPELESS: NOT AT ALL
SUM OF ALL RESPONSES TO PHQ9 QUESTIONS 1 AND 2: 0
1. LITTLE INTEREST OR PLEASURE IN DOING THINGS: NOT AT ALL
2. FEELING DOWN, DEPRESSED OR HOPELESS: NOT AT ALL

## 2025-03-19 ASSESSMENT — PAIN SCALES - GENERAL: PAINLEVEL_OUTOF10: 7

## 2025-03-19 NOTE — TELEPHONE ENCOUNTER
FT - HTN check and abdominal pain follow-up - 6/23/2025  Please call patient if she needs blood work prior to this appointment.

## 2025-03-19 NOTE — PROGRESS NOTES
Subjective   Reason for Visit: Selam Eubanks is an 64 y.o. female here for a Medicare Wellness visit. She is a new patient. Prior pt of Dr. Samuel Colby.    Past Medical, Surgical, and Family History reviewed and updated in chart.    Reviewed all medications by prescribing practitioner or clinical pharmacist (such as prescriptions, OTCs, herbal therapies and supplements) and documented in the medical record.    INTERVAL HX/CURRENT CONCERNS:  GI symptoms - long history of abdominal discomfort, bloating, constipation. Has been following with GI Dr. Tom    CHRONIC CONDITIONS:  Rheumatoid arthritis - on hydroxychloroquine and folic acid  Osteoarthritis  HTN - losartan 50 mg, hydrochlorothiazide 25 mg. Sees cardiology. Home readings - usually 140's/80's  HLD - rosuvastatin 10 mg  Allergies  GERD/Huizar's - under GI. Most recent EGD negative for Huizar's  Mild intermittent asthma - PRN DAVID, Singulair. Uses usually with weather change, illness.   Thyroid nodule - under ENT Dr. Sawyer. In process of getting US rescheduled, went on 3/6 and did not tolerate lying flat so study was aborted.     Specialists:   Cardiology - Dr. Maciel    Rheumatology - Dr. Rebolledo  Gastroenterology - Dr. Tabitha Tom  ENT - Dr. Sawyer     Health Maintenance  Immunizations:     Tdap 2018    Pneumococcal - PCV13 2017 PPSV23 2010. Discussed Prevnar 20. Will consider    Shingles - shingrix x 1 - decided to not have second after a friend developed GBS     COVID - discussed updated vaccine     Influenza - did not have this year    Colonoscopy: under Dr. Lu, last done 2023 - repeat 10 years  Lung cancer screening: Former smoker, quit 6 yrs ago. Prior 1PPD x 30+ yrs    WOMEN'S Health  Postmenopausal: yes  H/o Gyn Surgery - BTL  Last Pap: 2022 NILM/HPV-  History of abnormal pap: denies  Last mammogram: 2/2024   Bone Density: through rheumatology 2024 - osteopenia   -Calcium intake adequate. Vitamin D intake at 800-1000 IU/day.       Patient Care  "Team:  Rosamaria Og MD as PCP - General (Family Medicine)  Rosamaria Og MD as PCP - O Medicare Advantage PCP  Marbella Hartman MD as Consulting Physician (Rheumatology)  Tabitha Douglas MD as Surgeon (Gastroenterology)  SEVERO Gayle-CNP as Nurse Practitioner (Cardiology)     Review of Systems   Constitutional:  Negative for chills and fever.   HENT:  Negative for trouble swallowing.    Respiratory:  Negative for cough and shortness of breath.    Cardiovascular:  Negative for chest pain, palpitations and leg swelling.   Gastrointestinal:  Positive for abdominal pain (see HPI) and constipation. Negative for blood in stool and diarrhea.   Genitourinary:  Negative for dysuria and vaginal bleeding.   Psychiatric/Behavioral:  Negative for dysphoric mood.        Objective   Vitals:  /80   Pulse 91   Temp 36.4 °C (97.5 °F) (Temporal)   Ht 1.6 m (5' 3\")   Wt 69.9 kg (154 lb)   SpO2 96%   BMI 27.28 kg/m²       Physical Exam  Constitutional:       Appearance: Normal appearance.   HENT:      Right Ear: Tympanic membrane, ear canal and external ear normal.      Left Ear: Tympanic membrane, ear canal and external ear normal.      Mouth/Throat:      Mouth: Mucous membranes are moist.      Pharynx: Oropharynx is clear.   Eyes:      Extraocular Movements: Extraocular movements intact.      Conjunctiva/sclera: Conjunctivae normal.      Pupils: Pupils are equal, round, and reactive to light.   Cardiovascular:      Rate and Rhythm: Normal rate and regular rhythm.      Pulses: Normal pulses.      Heart sounds: Normal heart sounds.   Pulmonary:      Effort: Pulmonary effort is normal.      Breath sounds: Normal breath sounds. No wheezing, rhonchi or rales.   Abdominal:      General: Abdomen is flat.      Palpations: Abdomen is soft.      Tenderness: There is no abdominal tenderness. There is no guarding or rebound.   Musculoskeletal:         General: Normal range of motion.      Cervical back: Neck supple. "   Skin:     General: Skin is warm and dry.   Neurological:      Mental Status: She is alert.   Psychiatric:         Mood and Affect: Mood normal.         Behavior: Behavior normal.         Assessment & Plan  Routine general medical examination at health care facility  Well adult exam.  1. Age appropriate preventative measures reviewed.   2. Encouraged healthy diet and exercise.  3. Immunizations- Reviewed and discussed  4. Medications- Reviewed    Orders:    1 Year Follow Up In Primary Care - Wellness Exam; Future    Hyperlipidemia, unspecified hyperlipidemia type  Last lipid panel reviewed. Continue medication. Continue with lifestyle interventions including healthy diet  that includes lean proteins, vegetables, fruits, and whole grains.  Limit saturated fats, excess sodium, and processed foods.  Limit sugary drinks and sweets.  Moderate exercise at least 30 minutes per day, 5 days per week.         Primary hypertension  Elevated reading today with elevated home readings. Will increase Losartan to 100 mg daily. Follow up 2 mos. Continue hydrochlorothiazide same way.        Abdominal bloating  With ongoing abdominal discomfort. Following under GI and currently being worked up. Requests order for CT abdomen/pelvis. She does have upcoming appt GI. I suggested she see her gastroenterologist as scheduled and await recommendations from him. Will discuss further next office visit.       Encounter to establish care

## 2025-03-21 RX ORDER — LOSARTAN POTASSIUM 50 MG/1
50 TABLET ORAL DAILY
Qty: 90 TABLET | Refills: 2 | Status: SHIPPED | OUTPATIENT
Start: 2025-03-21 | End: 2026-04-25

## 2025-03-31 ENCOUNTER — APPOINTMENT (OUTPATIENT)
Dept: GASTROENTEROLOGY | Facility: CLINIC | Age: 65
End: 2025-03-31
Payer: MEDICARE

## 2025-04-05 NOTE — ASSESSMENT & PLAN NOTE
Last lipid panel reviewed. Continue medication. Continue with lifestyle interventions including healthy diet  that includes lean proteins, vegetables, fruits, and whole grains.  Limit saturated fats, excess sodium, and processed foods.  Limit sugary drinks and sweets.  Moderate exercise at least 30 minutes per day, 5 days per week.

## 2025-04-05 NOTE — ASSESSMENT & PLAN NOTE
Elevated reading today with elevated home readings. Will increase Losartan to 100 mg daily. Follow up 2 mos. Continue hydrochlorothiazide same way.

## 2025-04-07 DIAGNOSIS — K22.70 BARRETT'S ESOPHAGUS WITHOUT DYSPLASIA: ICD-10-CM

## 2025-04-07 DIAGNOSIS — K21.9 GASTROESOPHAGEAL REFLUX DISEASE, UNSPECIFIED WHETHER ESOPHAGITIS PRESENT: ICD-10-CM

## 2025-04-07 RX ORDER — OMEPRAZOLE 20 MG/1
20 CAPSULE, DELAYED RELEASE ORAL DAILY
Qty: 90 CAPSULE | Refills: 1 | Status: SHIPPED | OUTPATIENT
Start: 2025-04-07

## 2025-04-09 ENCOUNTER — PATIENT MESSAGE (OUTPATIENT)
Dept: PRIMARY CARE | Facility: CLINIC | Age: 65
End: 2025-04-09
Payer: MEDICARE

## 2025-04-09 DIAGNOSIS — J30.9 ALLERGIC RHINITIS, UNSPECIFIED SEASONALITY, UNSPECIFIED TRIGGER: ICD-10-CM

## 2025-04-09 LAB
ALBUMIN SERPL-MCNC: 4.5 G/DL (ref 3.6–5.1)
ALP SERPL-CCNC: 72 U/L (ref 37–153)
ALT SERPL-CCNC: 23 U/L (ref 6–29)
ANION GAP SERPL CALCULATED.4IONS-SCNC: 12 MMOL/L (CALC) (ref 7–17)
AST SERPL-CCNC: 43 U/L (ref 10–35)
BASOPHILS # BLD AUTO: 39 CELLS/UL (ref 0–200)
BASOPHILS NFR BLD AUTO: 0.7 %
BILIRUB SERPL-MCNC: 0.5 MG/DL (ref 0.2–1.2)
BUN SERPL-MCNC: 14 MG/DL (ref 7–25)
CALCIUM SERPL-MCNC: 9.8 MG/DL (ref 8.6–10.4)
CHLORIDE SERPL-SCNC: 94 MMOL/L (ref 98–110)
CHOLEST SERPL-MCNC: 182 MG/DL
CHOLEST/HDLC SERPL: 1.9 (CALC)
CO2 SERPL-SCNC: 29 MMOL/L (ref 20–32)
CREAT SERPL-MCNC: 0.8 MG/DL (ref 0.5–1.05)
EGFRCR SERPLBLD CKD-EPI 2021: 81 ML/MIN/1.73M2
EOSINOPHIL # BLD AUTO: 0 CELLS/UL (ref 15–500)
EOSINOPHIL NFR BLD AUTO: 0 %
ERYTHROCYTE [DISTWIDTH] IN BLOOD BY AUTOMATED COUNT: 12.1 % (ref 11–15)
GLUCOSE SERPL-MCNC: 94 MG/DL (ref 65–99)
HCT VFR BLD AUTO: 38.2 % (ref 35–45)
HDLC SERPL-MCNC: 98 MG/DL
HGB BLD-MCNC: 12.6 G/DL (ref 11.7–15.5)
LDLC SERPL CALC-MCNC: 70 MG/DL (CALC)
LYMPHOCYTES # BLD AUTO: 1260 CELLS/UL (ref 850–3900)
LYMPHOCYTES NFR BLD AUTO: 22.5 %
MCH RBC QN AUTO: 31.3 PG (ref 27–33)
MCHC RBC AUTO-ENTMCNC: 33 G/DL (ref 32–36)
MCV RBC AUTO: 94.8 FL (ref 80–100)
MONOCYTES # BLD AUTO: 526 CELLS/UL (ref 200–950)
MONOCYTES NFR BLD AUTO: 9.4 %
NEUTROPHILS # BLD AUTO: 3774 CELLS/UL (ref 1500–7800)
NEUTROPHILS NFR BLD AUTO: 67.4 %
NONHDLC SERPL-MCNC: 84 MG/DL (CALC)
PLATELET # BLD AUTO: 267 THOUSAND/UL (ref 140–400)
PMV BLD REES-ECKER: 10.1 FL (ref 7.5–12.5)
POTASSIUM SERPL-SCNC: 4.3 MMOL/L (ref 3.5–5.3)
PROT SERPL-MCNC: 7.4 G/DL (ref 6.1–8.1)
RBC # BLD AUTO: 4.03 MILLION/UL (ref 3.8–5.1)
SODIUM SERPL-SCNC: 135 MMOL/L (ref 135–146)
TRIGL SERPL-MCNC: 60 MG/DL
TSH SERPL-ACNC: 2.13 MIU/L (ref 0.4–4.5)
WBC # BLD AUTO: 5.6 THOUSAND/UL (ref 3.8–10.8)

## 2025-04-09 RX ORDER — MONTELUKAST SODIUM 10 MG/1
10 TABLET ORAL EVERY 24 HOURS
Qty: 90 TABLET | Refills: 1 | Status: SHIPPED | OUTPATIENT
Start: 2025-04-09 | End: 2026-04-09

## 2025-04-28 ENCOUNTER — OFFICE VISIT (OUTPATIENT)
Dept: PRIMARY CARE | Facility: CLINIC | Age: 65
End: 2025-04-28
Payer: MEDICARE

## 2025-04-28 VITALS
BODY MASS INDEX: 27.21 KG/M2 | WEIGHT: 153.6 LBS | SYSTOLIC BLOOD PRESSURE: 126 MMHG | DIASTOLIC BLOOD PRESSURE: 80 MMHG | OXYGEN SATURATION: 96 % | HEART RATE: 76 BPM | TEMPERATURE: 98 F | HEIGHT: 63 IN

## 2025-04-28 DIAGNOSIS — M06.9 RHEUMATOID ARTHRITIS, INVOLVING UNSPECIFIED SITE, UNSPECIFIED WHETHER RHEUMATOID FACTOR PRESENT (MULTI): ICD-10-CM

## 2025-04-28 DIAGNOSIS — H65.192 ACUTE EFFUSION OF LEFT EAR: ICD-10-CM

## 2025-04-28 DIAGNOSIS — J31.0 CHRONIC RHINITIS: Primary | ICD-10-CM

## 2025-04-28 PROCEDURE — 1159F MED LIST DOCD IN RCRD: CPT

## 2025-04-28 PROCEDURE — 1160F RVW MEDS BY RX/DR IN RCRD: CPT

## 2025-04-28 PROCEDURE — 1157F ADVNC CARE PLAN IN RCRD: CPT

## 2025-04-28 PROCEDURE — 99213 OFFICE O/P EST LOW 20 MIN: CPT

## 2025-04-28 PROCEDURE — 3008F BODY MASS INDEX DOCD: CPT

## 2025-04-28 PROCEDURE — 3079F DIAST BP 80-89 MM HG: CPT

## 2025-04-28 PROCEDURE — 1123F ACP DISCUSS/DSCN MKR DOCD: CPT

## 2025-04-28 PROCEDURE — 3074F SYST BP LT 130 MM HG: CPT

## 2025-04-28 PROCEDURE — 1125F AMNT PAIN NOTED PAIN PRSNT: CPT

## 2025-04-28 PROCEDURE — 1036F TOBACCO NON-USER: CPT

## 2025-04-28 PROCEDURE — G8433 SCR FOR DEP NOT CPT DOC RSN: HCPCS

## 2025-04-28 RX ORDER — PREDNISONE 20 MG/1
20 TABLET ORAL 2 TIMES DAILY
Qty: 10 TABLET | Refills: 0 | Status: SHIPPED | OUTPATIENT
Start: 2025-04-28 | End: 2025-05-03

## 2025-04-28 RX ORDER — FLUTICASONE PROPIONATE 50 MCG
1 SPRAY, SUSPENSION (ML) NASAL DAILY
Qty: 16 G | Refills: 1 | Status: SHIPPED | OUTPATIENT
Start: 2025-04-28

## 2025-04-28 RX ORDER — LEVOCETIRIZINE DIHYDROCHLORIDE 5 MG/1
5 TABLET, FILM COATED ORAL EVERY EVENING
Qty: 30 TABLET | Refills: 0 | Status: SHIPPED | OUTPATIENT
Start: 2025-04-28 | End: 2025-05-28

## 2025-04-28 ASSESSMENT — PATIENT HEALTH QUESTIONNAIRE - PHQ9
2. FEELING DOWN, DEPRESSED OR HOPELESS: NOT AT ALL
1. LITTLE INTEREST OR PLEASURE IN DOING THINGS: NOT AT ALL
SUM OF ALL RESPONSES TO PHQ9 QUESTIONS 1 AND 2: 0

## 2025-04-28 ASSESSMENT — PAIN SCALES - GENERAL: PAINLEVEL_OUTOF10: 8

## 2025-04-28 ASSESSMENT — COLUMBIA-SUICIDE SEVERITY RATING SCALE - C-SSRS
6. HAVE YOU EVER DONE ANYTHING, STARTED TO DO ANYTHING, OR PREPARED TO DO ANYTHING TO END YOUR LIFE?: NO
2. HAVE YOU ACTUALLY HAD ANY THOUGHTS OF KILLING YOURSELF?: NO
1. IN THE PAST MONTH, HAVE YOU WISHED YOU WERE DEAD OR WISHED YOU COULD GO TO SLEEP AND NOT WAKE UP?: NO

## 2025-04-28 NOTE — PROGRESS NOTES
"Subjective     Patient ID: Selam Eubanks is a 64 y.o. female who presents for Sick Visit (Ear pain , sore throat left side glands /Pt went to urgent care , tested for strep was negative/Started about over a week ago ).      HPI  Selam presents with complaints of left ear pain which is radiating down her left neck. Symptoms initially began about 8-9 days ago. She does noted PND but no other associated symptoms. Denies any ill exposures.  No recent travel, no swimming.  She was seen at , prescribed Azithromycin which she finished today. She also took Prednisone 20 mg daily x3 days with symptom improvement.   She also has history of chronic sinus congestion,. Currently taking flonase daily, OTC generic antihistamine.     Patient's recent visit notes, medication and allergy lists, past medical surgical social hx, immunization, vitals, problem list, recent tests were reviewed by me for pertinence to this visit.        Review of Systems  All other systems have been reviewed and are negative except as noted in the HPI.         Objective   /80 (BP Location: Left arm, Patient Position: Sitting, BP Cuff Size: Adult)   Pulse 76   Temp 36.7 °C (98 °F) (Temporal)   Ht 1.6 m (5' 3\")   Wt 69.7 kg (153 lb 9.6 oz)   SpO2 96%   BMI 27.21 kg/m²       Physical Exam  Vitals and nursing note reviewed.   Constitutional:       General: She is not in acute distress.     Appearance: Normal appearance.   HENT:      Right Ear: Tympanic membrane, ear canal and external ear normal.      Left Ear: Ear canal and external ear normal. A middle ear effusion is present. No mastoid tenderness.      Nose: Congestion and rhinorrhea present. Rhinorrhea is clear.      Right Sinus: No maxillary sinus tenderness or frontal sinus tenderness.      Left Sinus: No maxillary sinus tenderness or frontal sinus tenderness.      Mouth/Throat:      Lips: Pink.      Mouth: Mucous membranes are moist.      Pharynx: Oropharynx is clear. Uvula midline. " Posterior oropharyngeal erythema and postnasal drip present.   Neck:      Trachea: Trachea and phonation normal.   Musculoskeletal:      Cervical back: Normal range of motion and neck supple.   Lymphadenopathy:      Cervical: Cervical adenopathy present.   Neurological:      Mental Status: She is alert.   Psychiatric:         Behavior: Behavior is cooperative.           Assessment & Plan  Chronic rhinitis  Chronic condition, needing tighter symptom management.  Suspect that her chronic rhinitis is creating her left ear effusion.  Begin Xyzal 5 mg by mouth daily and Flonase nasal spray as discussed.  Follow-up in 1 to 2 weeks if symptoms persist or sooner if seem to worsen  Orders:    levocetirizine (Xyzal) 5 mg tablet; Take 1 tablet (5 mg) by mouth once daily in the evening.    fluticasone (Flonase) 50 mcg/actuation nasal spray; Administer 1 spray into each nostril once daily.    Acute effusion of left ear  Acute problem likely secondary to chronic rhinitis and change of season  Begin Xyzal 5 mg by mouth daily and Flonase nasal spray as discussed.  Prednisone 20 mg twice daily for 5 days.  Follow-up in 1 to 2 weeks if symptoms persist or sooner if seem to worsen  Orders:    levocetirizine (Xyzal) 5 mg tablet; Take 1 tablet (5 mg) by mouth once daily in the evening.    fluticasone (Flonase) 50 mcg/actuation nasal spray; Administer 1 spray into each nostril once daily.    predniSONE (Deltasone) 20 mg tablet; Take 1 tablet (20 mg) by mouth 2 times a day for 5 days.      Kirsty Travis, APRN-CNP

## 2025-04-29 ENCOUNTER — TELEPHONE (OUTPATIENT)
Dept: PRIMARY CARE | Facility: CLINIC | Age: 65
End: 2025-04-29
Payer: MEDICARE

## 2025-04-29 RX ORDER — FOLIC ACID 1 MG/1
2 TABLET ORAL DAILY
Qty: 180 TABLET | Refills: 1 | Status: SHIPPED | OUTPATIENT
Start: 2025-04-29

## 2025-04-29 NOTE — TELEPHONE ENCOUNTER
Pt called answering service 4-28 at 559 pm, was seen today for ear infection meds not sent to [facility] [phone]

## 2025-05-12 ENCOUNTER — PATIENT MESSAGE (OUTPATIENT)
Dept: PRIMARY CARE | Facility: CLINIC | Age: 65
End: 2025-05-12
Payer: MEDICARE

## 2025-05-12 DIAGNOSIS — E78.5 HYPERLIPIDEMIA, UNSPECIFIED HYPERLIPIDEMIA TYPE: ICD-10-CM

## 2025-05-12 RX ORDER — ROSUVASTATIN CALCIUM 10 MG/1
10 TABLET, COATED ORAL DAILY
Qty: 90 TABLET | Refills: 2 | Status: SHIPPED | OUTPATIENT
Start: 2025-05-12 | End: 2026-05-12

## 2025-05-21 ENCOUNTER — APPOINTMENT (OUTPATIENT)
Dept: GASTROENTEROLOGY | Facility: CLINIC | Age: 65
End: 2025-05-21
Payer: MEDICARE

## 2025-06-04 ENCOUNTER — APPOINTMENT (OUTPATIENT)
Dept: RADIOLOGY | Facility: CLINIC | Age: 65
End: 2025-06-04
Payer: MEDICARE

## 2025-06-04 PROCEDURE — 76536 US EXAM OF HEAD AND NECK: CPT | Performed by: RADIOLOGY

## 2025-06-04 PROCEDURE — 76536 US EXAM OF HEAD AND NECK: CPT

## 2025-06-09 DIAGNOSIS — E04.1 THYROID NODULE: Primary | ICD-10-CM

## 2025-06-10 ENCOUNTER — PATIENT MESSAGE (OUTPATIENT)
Dept: PRIMARY CARE | Facility: CLINIC | Age: 65
End: 2025-06-10
Payer: MEDICARE

## 2025-06-10 DIAGNOSIS — J45.20 MILD INTERMITTENT REACTIVE AIRWAY DISEASE WITHOUT COMPLICATION (HHS-HCC): ICD-10-CM

## 2025-06-10 RX ORDER — ALBUTEROL SULFATE 90 UG/1
2 INHALANT RESPIRATORY (INHALATION) EVERY 6 HOURS PRN
Qty: 6.7 G | Refills: 0 | Status: SHIPPED | OUTPATIENT
Start: 2025-06-10

## 2025-06-11 ENCOUNTER — APPOINTMENT (OUTPATIENT)
Dept: RADIOLOGY | Facility: CLINIC | Age: 65
End: 2025-06-11
Payer: MEDICARE

## 2025-06-11 ENCOUNTER — TELEPHONE (OUTPATIENT)
Dept: PRIMARY CARE | Facility: CLINIC | Age: 65
End: 2025-06-11
Payer: MEDICARE

## 2025-06-11 ENCOUNTER — OFFICE VISIT (OUTPATIENT)
Dept: PRIMARY CARE | Facility: CLINIC | Age: 65
End: 2025-06-11
Payer: MEDICARE

## 2025-06-11 VITALS
BODY MASS INDEX: 26.47 KG/M2 | WEIGHT: 149.4 LBS | DIASTOLIC BLOOD PRESSURE: 80 MMHG | SYSTOLIC BLOOD PRESSURE: 118 MMHG | OXYGEN SATURATION: 96 % | HEART RATE: 85 BPM | TEMPERATURE: 97.4 F

## 2025-06-11 DIAGNOSIS — J20.9 ACUTE BRONCHITIS, UNSPECIFIED ORGANISM: Primary | ICD-10-CM

## 2025-06-11 PROCEDURE — 1125F AMNT PAIN NOTED PAIN PRSNT: CPT

## 2025-06-11 PROCEDURE — 99213 OFFICE O/P EST LOW 20 MIN: CPT

## 2025-06-11 PROCEDURE — 1159F MED LIST DOCD IN RCRD: CPT

## 2025-06-11 PROCEDURE — 1160F RVW MEDS BY RX/DR IN RCRD: CPT

## 2025-06-11 PROCEDURE — 3079F DIAST BP 80-89 MM HG: CPT

## 2025-06-11 PROCEDURE — 3074F SYST BP LT 130 MM HG: CPT

## 2025-06-11 RX ORDER — DOXYCYCLINE 100 MG/1
100 CAPSULE ORAL 2 TIMES DAILY
Qty: 14 CAPSULE | Refills: 0 | Status: SHIPPED | OUTPATIENT
Start: 2025-06-11 | End: 2025-06-18

## 2025-06-11 ASSESSMENT — ENCOUNTER SYMPTOMS
FEVER: 0
NAUSEA: 0
APPETITE CHANGE: 1
SORE THROAT: 0
SHORTNESS OF BREATH: 0
SINUS PAIN: 0
VOMITING: 0
WHEEZING: 0
CHILLS: 1
HEADACHES: 1
SINUS PRESSURE: 0
COUGH: 1
RHINORRHEA: 1

## 2025-06-11 ASSESSMENT — PAIN SCALES - GENERAL: PAINLEVEL_OUTOF10: 4

## 2025-06-11 NOTE — PROGRESS NOTES
Subjective   Patient ID: Selam Eubanks is a 64 y.o. female who presents for Cough (Pt here with c/o fatigue, cough, congestion and earache that was addressed at previous visit but did not improve despite taking medication as prescribed.).    Cough  This is a new problem. The current episode started in the past 7 days. The problem has been gradually worsening. The cough is Productive of sputum. Associated symptoms include chills, ear congestion, headaches, nasal congestion (chronic), postnasal drip and rhinorrhea. Pertinent negatives include no chest pain, fever, sore throat, shortness of breath or wheezing. She has tried oral steroids and a beta-agonist inhaler (Xyzal,Flonase) for the symptoms. The treatment provided no relief. Her past medical history is significant for asthma and environmental allergies.   Of note had Z-Mark in 04/25 with no relief.    Review of Systems   Constitutional:  Positive for appetite change and chills. Negative for fever.   HENT:  Positive for congestion, postnasal drip and rhinorrhea. Negative for sinus pressure, sinus pain and sore throat.    Respiratory:  Positive for cough. Negative for shortness of breath and wheezing.    Cardiovascular:  Negative for chest pain.   Gastrointestinal:  Negative for nausea and vomiting.   Allergic/Immunologic: Positive for environmental allergies.   Neurological:  Positive for headaches.     Objective   /80   Pulse 85   Temp 36.3 °C (97.4 °F) (Temporal)   Wt 67.8 kg (149 lb 6.4 oz)   SpO2 96%   BMI 26.47 kg/m²     Physical Exam  Vitals and nursing note reviewed.   Constitutional:       General: She is not in acute distress.     Appearance: Normal appearance.   HENT:      Right Ear: Tympanic membrane and ear canal normal.      Left Ear: Tympanic membrane and ear canal normal.      Nose: Congestion present.      Mouth/Throat:      Mouth: Mucous membranes are moist.   Eyes:      Extraocular Movements: Extraocular movements intact.       Conjunctiva/sclera: Conjunctivae normal.   Cardiovascular:      Rate and Rhythm: Normal rate and regular rhythm.   Pulmonary:      Effort: Pulmonary effort is normal.      Breath sounds: Normal breath sounds.   Musculoskeletal:      Cervical back: Neck supple.   Lymphadenopathy:      Cervical: No cervical adenopathy.   Neurological:      General: No focal deficit present.      Mental Status: She is alert.   Psychiatric:         Mood and Affect: Mood normal.         Assessment/Plan   Assessment & Plan  Acute bronchitis, unspecified organism  Acute.   Doxycycline as directed. Risks and benefits of medication discussed and prescribed.   OTC Tylenol/Ibuprofen as directed for sinus pain. OTC Flonase as directed for sinus congestion and ear pressure. OTC antihistamine as directed for sinus drainage.  OTC Mucinex DM as directed for cough and chest congestion.  Albuterol as needed.  Will hold off on oral prednisone at this time, no wheezing noted on exam.  Increase fluids, rest, humidifier.   Follow up if symptoms do not improve within 7-10 days, or sooner for worsening.     Orders:    doxycycline (Vibramycin) 100 mg capsule; Take 1 capsule (100 mg) by mouth 2 times a day for 7 days. Take with at least 8 ounces (large glass) of water, do not lie down for 30 minutes after.

## 2025-06-18 ENCOUNTER — APPOINTMENT (OUTPATIENT)
Dept: GASTROENTEROLOGY | Facility: CLINIC | Age: 65
End: 2025-06-18
Payer: MEDICARE

## 2025-06-18 VITALS — WEIGHT: 150 LBS | BODY MASS INDEX: 26.58 KG/M2 | HEIGHT: 63 IN | HEART RATE: 85 BPM

## 2025-06-18 DIAGNOSIS — R14.0 BLOATING: ICD-10-CM

## 2025-06-18 DIAGNOSIS — R10.84 GENERALIZED ABDOMINAL PAIN: Primary | ICD-10-CM

## 2025-06-18 DIAGNOSIS — K59.09 CHRONIC CONSTIPATION: ICD-10-CM

## 2025-06-18 PROCEDURE — 1036F TOBACCO NON-USER: CPT | Performed by: INTERNAL MEDICINE

## 2025-06-18 PROCEDURE — 1159F MED LIST DOCD IN RCRD: CPT | Performed by: INTERNAL MEDICINE

## 2025-06-18 PROCEDURE — 1160F RVW MEDS BY RX/DR IN RCRD: CPT | Performed by: INTERNAL MEDICINE

## 2025-06-18 PROCEDURE — 99214 OFFICE O/P EST MOD 30 MIN: CPT | Performed by: INTERNAL MEDICINE

## 2025-06-18 PROCEDURE — 3008F BODY MASS INDEX DOCD: CPT | Performed by: INTERNAL MEDICINE

## 2025-06-18 NOTE — PROGRESS NOTES
REASON FOR VISIT: Discuss abdominal symptoms    HPI:  Selam Eubanks is a 64 y.o. female with a past medical history of chronic constipation here to follow-up on abdominal symptoms of bloating and discomfort.  Reports recently noticed more persistent abdominal fullness and pressure symptoms.  She tried Xifaxan for 2 weeks after last visit but reports no improvement.  He is off dairy products.  Has been using MiraLAX and moving her bowels without but feels that this discomfort has not improved.  Last CT imaging of the abdomen was about 10 years ago.  She has had a gradual weight loss of up to 18 pounds and was partially trying to modify her diet with that.  Feels full quickly she reports.  Prior endoscopic evaluation had shown small hiatal hernia but no evidence of any gastric outlet obstruction or significant inflammatory findings.          PRIOR ENDOSCOPY    PAST MEDICAL HISTORY  Medical History[1]    PAST SURGICAL HISTORY  Surgical History[2]    FAMILY HISTORY  Family History[3]    SOCIAL HISTORY  Social History     Tobacco Use    Smoking status: Former     Current packs/day: 0.00     Average packs/day: 1.5 packs/day for 15.0 years (22.5 ttl pk-yrs)     Types: Cigarettes     Quit date: 2019     Years since quittin.4    Smokeless tobacco: Former   Substance Use Topics    Alcohol use: Not Currently     Alcohol/week: 3.0 - 6.0 standard drinks of alcohol     Types: 3 - 6 Standard drinks or equivalent per week       REVIEW OF SYSTEMS  CONSTITUTIONAL: negative for fever, chills, fatigue, or unintentional weight loss,   HEENT: negative for icteric sclera, eye pain/redness, or changes in vision/hearing  RESPIRATORY: negative for cough, hemoptysis, wheezing, orthopnea, or dyspnea on exertion  CARDIOVASCULAR: negative for chest pain, palpitations, or syncope   GASTROINTESTINAL: as noted per HPI  GENITOURINARY: negative for dysuria, polyuria, incontinence, or hematuria  MUSCULOSKELETAL: negative for arthralgia, myalgia,  "or joint swelling/stiffness   INTEGUMENTARY/SKIN: negative jaundice, rash, or skin lesion  HEMATOLOGIC/LYMPHATIC: negative for prolonged bleeding, easy bruising, or swollen lymph nodes  ENDOCRINE: negative for cold/heat intolerance, polydipsia, polyuria, or goiter  NEUROLOGIC: negative for headaches, dizziness, tremor, or gait abnormality  PSYCHIATRIC: negative for anxiety, depression, personality changes, or sleep disturbances      A 10 point review of systems was completed and was otherwise negative.    ALLERGIES  Allergies[4]    MEDICATIONS  Current Medications[5]    VITALS  Pulse 85   Ht 1.6 m (5' 3\")   Wt 68 kg (150 lb)   BMI 26.57 kg/m²      PHYSICAL EXAM  Alert and oriented in no acute distress  Abdomen mild tenderness to palpation mostly epigastric region, no rebound tenderness, no hernias appreciated    ASSESSMENT/ PLAN  Patient with ongoing abdominal discomfort and bloating.  Chronic constipation treated with MiraLAX but having ongoing symptoms of abdominal pressure.  Has had some gradual weight loss partially with changing her diet but now up to 18 pound weight loss.  Given the symptoms we will pursue CT imaging to rule out any other underlying process including neoplasia.  Reviewed prior workup including endoscopy.  She is in agreement with the plan I will follow-up with her on CT imaging.        Signature: Tabitha Douglas MD    Date: 6/18/2025  Time: 1:42 PM         [1]   Past Medical History:  Diagnosis Date    Abnormal finding of blood chemistry, unspecified 02/19/2018    Abnormal blood chemistry    Abnormal levels of other serum enzymes 12/30/2015    Abnormal AST and ALT    Acute bronchitis, unspecified 02/04/2019    Acute exacerbation of chronic bronchitis    Acute bronchitis, unspecified 02/02/2018    Acute bronchitis due to infection    Acute exacerbation of chronic bronchitis (Multi) 02/25/2025    Acute maxillary sinusitis, unspecified 09/16/2019    Acute maxillary sinusitis    Acute maxillary " sinusitis, unspecified 2019    Acute maxillary sinusitis    Acute upper respiratory infection, unspecified 2020    Viral URI with cough    Allergic     Anxiety     Arthralgia of hip 2025    Arthritis     Asthma     Huizar's esophagus without dysplasia 2017    Huizar's esophagus without dysplasia    Huizar's esophagus without dysplasia 2016    Huizar's esophagus without dysplasia    Huizar's esophagus without dysplasia 2016    Huizar's esophagus    Bronchitis 2025    Chronic maxillary sinusitis 2019    Maxillary sinusitis    Congestion of respiratory tract 2025    Encounter for full-term uncomplicated delivery      (spontaneous vaginal delivery)    GERD (gastroesophageal reflux disease)     Hypertension     Influenza 2025    Muscle pain 2025    Other conditions influencing health status     Complete Colonoscopy    Other general symptoms and signs 2020    Influenza-like symptoms    Other long term (current) drug therapy 10/21/2020    Chronic use of benzodiazepine for therapeutic purpose    Other specified anxiety disorders 2016    Depression with anxiety    Persistent cough 2025    Personal history of other diseases of the digestive system 2016    History of esophageal reflux    Personal history of other diseases of the digestive system 2015    History of hiatal hernia    Personal history of other diseases of the digestive system 2020    History of gastroesophageal reflux (GERD)    Personal history of other diseases of the musculoskeletal system and connective tissue 2022    History of rheumatoid arthritis    Personal history of other diseases of the nervous system and sense organs     History of blepharitis    Personal history of other diseases of the nervous system and sense organs 2014    History of carpal tunnel syndrome    Personal history of other diseases of the respiratory system  09/20/2017    History of sinusitis    Personal history of other diseases of the respiratory system 09/16/2019    History of acute bronchitis with bronchospasm    Personal history of other diseases of the respiratory system 03/12/2020    History of influenza    Personal history of other diseases of the respiratory system 10/21/2018    History of bronchitis    Personal history of other specified conditions 02/02/2018    History of persistent cough    Pleurodynia 02/02/2018    Rib pain on left side    Radiculopathy, lumbosacral region 07/30/2020    Lumbosacral radiculitis    Segmental and somatic dysfunction 02/25/2025    Seropositive rheumatoid arthritis 02/25/2025    Spinal stenosis, lumbar region without neurogenic claudication 06/05/2020    Stenosis of lateral recess of lumbar spine    Trigger finger, right ring finger 08/20/2019    Trigger ring finger of right hand    Trigger finger, unspecified finger 05/15/2013    Trigger finger, acquired    Vitamin D deficiency, unspecified 07/25/2016    Vitamin D deficiency   [2]   Past Surgical History:  Procedure Laterality Date    ADENOIDECTOMY      BACK SURGERY      COLONOSCOPY  03/20/2017    Colonoscopy (Fiberoptic)    CT ANGIO NECK  11/15/2021    CT NECK ANGIO W AND WO IV CONTRAST 11/15/2021 [facility]    MOUTH SURGERY  03/20/2017    Oral Surgery Tooth Extraction    OTHER SURGICAL HISTORY  04/01/2015    Drainage Of Ovarian Cyst(S)    OTHER SURGICAL HISTORY  03/20/2017    Endoscopy - Papillotomy    SPINE SURGERY      TONSILLECTOMY  03/20/2017    Tonsillectomy With Adenoidectomy    TUBAL LIGATION  04/01/2015    Tubal Ligation   [3]   Family History  Problem Relation Name Age of Onset    Heart disease Father Ilir    [4]   Allergies  Allergen Reactions    Amoxicillin Hives and Swelling    Penicillins Hives   [5]   Current Outpatient Medications   Medication Sig Dispense Refill    albuterol (Proventil HFA) 90 mcg/actuation inhaler Inhale 2 puffs every 6 hours if  needed for wheezing. 6.7 g 0    aspirin 81 mg EC tablet Take 1 tablet (81 mg) by mouth once daily.      doxycycline (Vibramycin) 100 mg capsule Take 1 capsule (100 mg) by mouth 2 times a day for 7 days. Take with at least 8 ounces (large glass) of water, do not lie down for 30 minutes after. 14 capsule 0    fluticasone (Flonase) 50 mcg/actuation nasal spray Administer 1 spray into each nostril once daily. 16 g 1    folic acid (Folvite) 1 mg tablet TAKE TWO TABLETS BY MOUTH EVERY  tablet 1    hydroCHLOROthiazide (HYDRODiuril) 25 mg tablet Take 1 tablet (25 mg) by mouth once daily. 90 tablet 3    hydroxychloroquine (Plaquenil) 200 mg tablet Take 1 tablet (200 mg) by mouth 2 times a day. 180 tablet 1    levocetirizine (Xyzal) 5 mg tablet Take 1 tablet (5 mg) by mouth once daily in the evening. 30 tablet 2    losartan (Cozaar) 50 mg tablet Take 1 tablet (50 mg) by mouth once daily. 90 tablet 2    montelukast (Singulair) 10 mg tablet Take 1 tablet (10 mg) by mouth once every 24 hours. 90 tablet 1    multivitamin tablet Take 1 tablet by mouth once daily.      omeprazole (PriLOSEC) 20 mg DR capsule Take 1 capsule (20 mg) by mouth once daily. 90 capsule 1    potassium chloride CR 10 mEq ER tablet Take 1 tablet (10 mEq) by mouth once daily. with food 90 tablet 1    rosuvastatin (Crestor) 10 mg tablet Take 1 tablet (10 mg) by mouth once daily. 90 tablet 2    simethicone (Mylicon,Gas-X) 125 mg capsule Take 1 capsule (125 mg) by mouth every 6 hours if needed for flatulence (or bloating). 28 capsule 0    albuterol 2.5 mg /3 mL (0.083 %) nebulizer solution Take 3 mL (2.5 mg) by nebulization 4 times a day as needed for wheezing or shortness of breath. 9 mL 11    desipramine (Norpramin) 10 mg tablet Take 1 tablet (10 mg) by mouth once daily at bedtime. 30 tablet 2     No current facility-administered medications for this visit.

## 2025-06-23 ENCOUNTER — APPOINTMENT (OUTPATIENT)
Dept: PRIMARY CARE | Facility: CLINIC | Age: 65
End: 2025-06-23
Payer: MEDICARE

## 2025-06-25 ENCOUNTER — HOSPITAL ENCOUNTER (OUTPATIENT)
Dept: RADIOLOGY | Facility: CLINIC | Age: 65
End: 2025-06-25
Payer: MEDICARE

## 2025-06-25 ENCOUNTER — OFFICE VISIT (OUTPATIENT)
Dept: PRIMARY CARE | Facility: CLINIC | Age: 65
End: 2025-06-25
Payer: MEDICARE

## 2025-06-25 VITALS
SYSTOLIC BLOOD PRESSURE: 124 MMHG | WEIGHT: 145 LBS | HEIGHT: 63 IN | BODY MASS INDEX: 25.69 KG/M2 | HEART RATE: 81 BPM | DIASTOLIC BLOOD PRESSURE: 78 MMHG | TEMPERATURE: 98 F | OXYGEN SATURATION: 94 %

## 2025-06-25 DIAGNOSIS — J31.0 CHRONIC RHINITIS: Primary | ICD-10-CM

## 2025-06-25 LAB
CREAT SERPL-MCNC: 0.85 MG/DL (ref 0.5–1.05)
EGFRCR SERPLBLD CKD-EPI 2021: 75 ML/MIN/1.73M2

## 2025-06-25 PROCEDURE — 1036F TOBACCO NON-USER: CPT

## 2025-06-25 PROCEDURE — 1159F MED LIST DOCD IN RCRD: CPT

## 2025-06-25 PROCEDURE — 1160F RVW MEDS BY RX/DR IN RCRD: CPT

## 2025-06-25 PROCEDURE — G8433 SCR FOR DEP NOT CPT DOC RSN: HCPCS

## 2025-06-25 PROCEDURE — 3074F SYST BP LT 130 MM HG: CPT

## 2025-06-25 PROCEDURE — 3008F BODY MASS INDEX DOCD: CPT

## 2025-06-25 PROCEDURE — 99213 OFFICE O/P EST LOW 20 MIN: CPT

## 2025-06-25 PROCEDURE — 3078F DIAST BP <80 MM HG: CPT

## 2025-06-25 ASSESSMENT — PATIENT HEALTH QUESTIONNAIRE - PHQ9
1. LITTLE INTEREST OR PLEASURE IN DOING THINGS: NOT AT ALL
2. FEELING DOWN, DEPRESSED OR HOPELESS: NOT AT ALL
SUM OF ALL RESPONSES TO PHQ9 QUESTIONS 1 AND 2: 0

## 2025-06-25 NOTE — PROGRESS NOTES
"Subjective     Patient ID: Selam Eubanks is a 65 y.o. female who presents for Ear Fullness (Left ear fullness feeling /Not gotten better since last visit . /Feeling of fluid inside).      HPI  Selam presents for continued left ear fullness, feels as though there is crackling in her ear.  She was seen for the same symptoms about 2 months ago, was diagnosed with left ear effusion along with chronic rhinitis.  At that visit she started Xyzal 5 mg by mouth daily as well as Flonase 1 spray each nostril twice daily and was given a short course of prednisone.  She admits her symptoms have improved since taking Xyzal daily and Flonase daily; however, still experiencing that left ear fullness and is concerned for infection.  No fevers or chills.  She was also treated for acute bronchitis about 2 weeks ago-treated with a course of doxycycline which has resolved her symptoms.    Patient's recent visit notes, medication and allergy lists, past medical surgical social hx, immunization, vitals, problem list, recent tests were reviewed by me for pertinence to this visit.        Review of Systems  All other systems have been reviewed and are negative except as noted in the HPI.         Objective   /78 (BP Location: Left arm, Patient Position: Sitting, BP Cuff Size: Adult)   Pulse 81   Temp 36.7 °C (98 °F) (Temporal)   Ht 1.6 m (5' 3\")   Wt 65.8 kg (145 lb)   SpO2 94%   BMI 25.69 kg/m²       Physical Exam  Vitals and nursing note reviewed.   Constitutional:       General: She is not in acute distress.     Appearance: Normal appearance.   HENT:      Right Ear: Hearing, tympanic membrane, ear canal and external ear normal. No swelling. No middle ear effusion. No mastoid tenderness. Tympanic membrane is not retracted or bulging.      Left Ear: Hearing, tympanic membrane, ear canal and external ear normal. No swelling.  No middle ear effusion. No mastoid tenderness. Tympanic membrane is not retracted or bulging.      " Nose: Mucosal edema present. No congestion or rhinorrhea.      Right Sinus: No maxillary sinus tenderness or frontal sinus tenderness.      Left Sinus: No maxillary sinus tenderness or frontal sinus tenderness.      Mouth/Throat:      Lips: Pink.      Mouth: Mucous membranes are moist.      Pharynx: Oropharynx is clear. Uvula midline. No pharyngeal swelling, oropharyngeal exudate, posterior oropharyngeal erythema, uvula swelling or postnasal drip.   Neurological:      Mental Status: She is alert.   Psychiatric:         Behavior: Behavior is cooperative.             Assessment & Plan  Chronic rhinitis  Chronic condition, I believe that this is likely the cause of her left ear fullness although her exam is unremarkable.  No obvious left ear effusion at this visit, improved from 2 months ago  She will continue Xyzal 5 mg by mouth daily and Flonase nasal spray.  We did discuss having her follow-up with ENT if her symptoms persisted.  She did admit that she is followed up with Dr. Sawyer for the symptoms with no concerning findings.  She will consider returning to ENT if symptoms persist.         Kirsty Travis, APRN-CNP

## 2025-06-30 ENCOUNTER — HOSPITAL ENCOUNTER (OUTPATIENT)
Dept: RADIOLOGY | Facility: CLINIC | Age: 65
Discharge: HOME | End: 2025-06-30
Payer: MEDICARE

## 2025-06-30 DIAGNOSIS — Z87.891 PERSONAL HISTORY OF NICOTINE DEPENDENCE: ICD-10-CM

## 2025-06-30 PROCEDURE — 71271 CT THORAX LUNG CANCER SCR C-: CPT | Performed by: RADIOLOGY

## 2025-06-30 PROCEDURE — 71271 CT THORAX LUNG CANCER SCR C-: CPT

## 2025-07-02 ENCOUNTER — TELEPHONE (OUTPATIENT)
Dept: PRIMARY CARE | Facility: CLINIC | Age: 65
End: 2025-07-02
Payer: MEDICARE

## 2025-07-02 NOTE — TELEPHONE ENCOUNTER
Patient called and stated someone from the office called her and she's returning their call.    Patient can be reached at [phone]

## 2025-07-03 ENCOUNTER — TELEPHONE (OUTPATIENT)
Dept: PRIMARY CARE | Facility: CLINIC | Age: 65
End: 2025-07-03
Payer: MEDICARE

## 2025-07-03 NOTE — TELEPHONE ENCOUNTER
----- Message from Rosamaria Og sent at 7/3/2025 11:34 AM EDT -----  If pt interested can schedule follow up in this office for her  cough  ----- Message -----  From: Marci Ashby MA  Sent: 7/2/2025   1:08 PM EDT  To: Rosamaria Og MD    Spoke with the patient and she said mentioned she still has a lingering cough that still sounds junky and is coughing up mucous at times. She also stated she called the ENT office but can not get in   till September 30, 2025. She did not know how to call about the cough since the ENT is the one that scheduled the CT.   ----- Message -----  From: Rosamaria Og MD  Sent: 7/2/2025  10:15 AM EDT  To: [facility] [place] [id number] Clinical Support Staff    Please let pt know lung CT showed a new lung nodule that requires surveillance. Will plan to repeat a CT in 3-6 months. An order for this has already been placed. Additional findings include lung   changes associated with cigarette smoking.   ----- Message -----  From: Samuel Colby MD  Sent: 7/2/2025   7:22 AM EDT  To: Rosamaria Og MD    Hello, this patient is now established with you. This scan has a follow up action item needed. Thanks  ----- Message -----  From: Interface, Radiology Results In  Sent: 7/1/2025   8:01 AM EDT  To: Samuel Colby MD

## 2025-07-07 ENCOUNTER — OFFICE VISIT (OUTPATIENT)
Dept: RHEUMATOLOGY | Facility: CLINIC | Age: 65
End: 2025-07-07
Payer: MEDICARE

## 2025-07-07 VITALS — SYSTOLIC BLOOD PRESSURE: 130 MMHG | DIASTOLIC BLOOD PRESSURE: 70 MMHG | WEIGHT: 149.5 LBS | BODY MASS INDEX: 26.48 KG/M2

## 2025-07-07 DIAGNOSIS — M06.9 RHEUMATOID ARTHRITIS, INVOLVING UNSPECIFIED SITE, UNSPECIFIED WHETHER RHEUMATOID FACTOR PRESENT (MULTI): Primary | ICD-10-CM

## 2025-07-07 DIAGNOSIS — M19.90 OSTEOARTHRITIS, UNSPECIFIED OSTEOARTHRITIS TYPE, UNSPECIFIED SITE: ICD-10-CM

## 2025-07-07 DIAGNOSIS — J45.20 MILD INTERMITTENT REACTIVE AIRWAY DISEASE WITHOUT COMPLICATION (HHS-HCC): ICD-10-CM

## 2025-07-07 PROCEDURE — 99214 OFFICE O/P EST MOD 30 MIN: CPT | Performed by: INTERNAL MEDICINE

## 2025-07-07 PROCEDURE — 3078F DIAST BP <80 MM HG: CPT | Performed by: INTERNAL MEDICINE

## 2025-07-07 PROCEDURE — 1159F MED LIST DOCD IN RCRD: CPT | Performed by: INTERNAL MEDICINE

## 2025-07-07 PROCEDURE — 3075F SYST BP GE 130 - 139MM HG: CPT | Performed by: INTERNAL MEDICINE

## 2025-07-07 NOTE — PROGRESS NOTES
Recheck  OA  /  RA   doing well with exception of increased pain with rainy weather.    HPI - She has some incr pain with rainy weather.  She gets intermittent L hip pain.  No swelling.  Intermittent AM stiffness/achiness.  No CP.  No current SOB - recently got over bronchitis.  Seeing GI for ongoing sx.      PE  NAD  RRR no r/m/g  CTA  No edema  No synovitis  NT and no pain with ROM throughout    A/P - OA and RA, gen doing well but with some incr pain with rainy weather  7/24 DEXA didn't meet FRAX criteria  Reviewed CMP and CBC from primary  Follow up 6 mo or sooner PRN

## 2025-07-07 NOTE — TELEPHONE ENCOUNTER
Please decline, called patient she does not need this right now. She will call for a refill when she does. It was just filled 3wks ago.

## 2025-07-08 RX ORDER — ALBUTEROL SULFATE 90 UG/1
2 INHALANT RESPIRATORY (INHALATION) EVERY 6 HOURS PRN
Qty: 6.7 G | Refills: 0 | Status: SHIPPED | OUTPATIENT
Start: 2025-07-08

## 2025-07-12 DIAGNOSIS — J30.9 ALLERGIC RHINITIS, UNSPECIFIED SEASONALITY, UNSPECIFIED TRIGGER: ICD-10-CM

## 2025-07-14 ENCOUNTER — APPOINTMENT (OUTPATIENT)
Dept: RADIOLOGY | Facility: CLINIC | Age: 65
End: 2025-07-14
Payer: MEDICARE

## 2025-07-14 DIAGNOSIS — K22.70 BARRETT'S ESOPHAGUS WITHOUT DYSPLASIA: ICD-10-CM

## 2025-07-14 DIAGNOSIS — K21.9 GASTROESOPHAGEAL REFLUX DISEASE, UNSPECIFIED WHETHER ESOPHAGITIS PRESENT: ICD-10-CM

## 2025-07-14 RX ORDER — OMEPRAZOLE 20 MG/1
20 CAPSULE, DELAYED RELEASE ORAL DAILY
Qty: 90 CAPSULE | Refills: 1 | Status: SHIPPED | OUTPATIENT
Start: 2025-07-14

## 2025-07-15 RX ORDER — MONTELUKAST SODIUM 10 MG/1
TABLET ORAL
Qty: 90 TABLET | Refills: 0 | Status: SHIPPED | OUTPATIENT
Start: 2025-07-15

## 2025-07-28 ENCOUNTER — APPOINTMENT (OUTPATIENT)
Dept: RADIOLOGY | Facility: CLINIC | Age: 65
End: 2025-07-28
Payer: MEDICARE

## 2025-07-28 DIAGNOSIS — R10.84 GENERALIZED ABDOMINAL PAIN: Primary | ICD-10-CM

## 2025-08-01 DIAGNOSIS — J31.0 CHRONIC RHINITIS: ICD-10-CM

## 2025-08-01 LAB
CREAT SERPL-MCNC: 0.79 MG/DL (ref 0.5–1.05)
EGFRCR SERPLBLD CKD-EPI 2021: 82 ML/MIN/1.73M2

## 2025-08-01 RX ORDER — LEVOCETIRIZINE DIHYDROCHLORIDE 5 MG/1
TABLET, FILM COATED ORAL
Qty: 90 TABLET | Refills: 1 | Status: SHIPPED | OUTPATIENT
Start: 2025-08-01

## 2025-08-11 ENCOUNTER — APPOINTMENT (OUTPATIENT)
Dept: RADIOLOGY | Facility: CLINIC | Age: 65
End: 2025-08-11
Payer: MEDICARE

## 2025-08-11 DIAGNOSIS — R10.84 GENERALIZED ABDOMINAL PAIN: ICD-10-CM

## 2025-08-11 PROCEDURE — 74177 CT ABD & PELVIS W/CONTRAST: CPT | Performed by: RADIOLOGY

## 2025-08-11 PROCEDURE — 2550000001 HC RX 255 CONTRASTS: Performed by: INTERNAL MEDICINE

## 2025-08-11 PROCEDURE — 74177 CT ABD & PELVIS W/CONTRAST: CPT

## 2025-08-11 RX ADMIN — IOHEXOL 75 ML: 350 INJECTION, SOLUTION INTRAVENOUS at 13:26

## 2025-08-12 ENCOUNTER — OFFICE VISIT (OUTPATIENT)
Dept: CARDIOLOGY | Facility: CLINIC | Age: 65
End: 2025-08-12
Payer: MEDICARE

## 2025-08-12 VITALS
HEART RATE: 81 BPM | WEIGHT: 145 LBS | OXYGEN SATURATION: 96 % | SYSTOLIC BLOOD PRESSURE: 136 MMHG | DIASTOLIC BLOOD PRESSURE: 79 MMHG | BODY MASS INDEX: 25.69 KG/M2

## 2025-08-12 DIAGNOSIS — I10 PRIMARY HYPERTENSION: Primary | ICD-10-CM

## 2025-08-12 DIAGNOSIS — E78.5 HYPERLIPIDEMIA, UNSPECIFIED HYPERLIPIDEMIA TYPE: ICD-10-CM

## 2025-08-12 PROCEDURE — G2211 COMPLEX E/M VISIT ADD ON: HCPCS | Performed by: NURSE PRACTITIONER

## 2025-08-12 PROCEDURE — 99214 OFFICE O/P EST MOD 30 MIN: CPT | Performed by: NURSE PRACTITIONER

## 2025-08-12 PROCEDURE — 1159F MED LIST DOCD IN RCRD: CPT | Performed by: NURSE PRACTITIONER

## 2025-08-12 PROCEDURE — 3078F DIAST BP <80 MM HG: CPT | Performed by: NURSE PRACTITIONER

## 2025-08-12 PROCEDURE — 99212 OFFICE O/P EST SF 10 MIN: CPT

## 2025-08-12 PROCEDURE — 3075F SYST BP GE 130 - 139MM HG: CPT | Performed by: NURSE PRACTITIONER

## 2025-08-12 ASSESSMENT — ENCOUNTER SYMPTOMS
ENDOCRINE NEGATIVE: 1
LOSS OF SENSATION IN FEET: 0
RESPIRATORY NEGATIVE: 1
NEUROLOGICAL NEGATIVE: 1
PSYCHIATRIC NEGATIVE: 1
DEPRESSION: 0
MUSCULOSKELETAL NEGATIVE: 1
EYES NEGATIVE: 1
CARDIOVASCULAR NEGATIVE: 1
HEMATOLOGIC/LYMPHATIC NEGATIVE: 1
GASTROINTESTINAL NEGATIVE: 1
CONSTITUTIONAL NEGATIVE: 1
OCCASIONAL FEELINGS OF UNSTEADINESS: 0

## 2025-08-13 ENCOUNTER — TELEPHONE (OUTPATIENT)
Dept: GASTROENTEROLOGY | Facility: CLINIC | Age: 65
End: 2025-08-13
Payer: MEDICARE

## 2025-08-20 DIAGNOSIS — M06.9 RHEUMATOID ARTHRITIS, INVOLVING UNSPECIFIED SITE, UNSPECIFIED WHETHER RHEUMATOID FACTOR PRESENT (MULTI): ICD-10-CM

## 2025-08-20 RX ORDER — HYDROXYCHLOROQUINE SULFATE 200 MG/1
200 TABLET, FILM COATED ORAL 2 TIMES DAILY
Qty: 180 TABLET | Refills: 1 | Status: SHIPPED | OUTPATIENT
Start: 2025-08-20

## 2025-09-04 ENCOUNTER — APPOINTMENT (OUTPATIENT)
Dept: RADIOLOGY | Facility: CLINIC | Age: 65
End: 2025-09-04
Payer: MEDICARE

## 2025-09-30 ENCOUNTER — APPOINTMENT (OUTPATIENT)
Dept: AUDIOLOGY | Facility: CLINIC | Age: 65
End: 2025-09-30
Payer: MEDICARE

## 2025-09-30 ENCOUNTER — APPOINTMENT (OUTPATIENT)
Dept: OTOLARYNGOLOGY | Facility: CLINIC | Age: 65
End: 2025-09-30
Payer: MEDICARE